# Patient Record
Sex: MALE | Race: WHITE | NOT HISPANIC OR LATINO | Employment: OTHER | URBAN - METROPOLITAN AREA
[De-identification: names, ages, dates, MRNs, and addresses within clinical notes are randomized per-mention and may not be internally consistent; named-entity substitution may affect disease eponyms.]

---

## 2023-08-14 ENCOUNTER — HOSPITAL ENCOUNTER (OUTPATIENT)
Dept: DATA CONVERSION | Facility: HOSPITAL | Age: 68
Discharge: HOME | End: 2023-08-14

## 2023-08-14 DIAGNOSIS — Z12.5 ENCOUNTER FOR SCREENING FOR MALIGNANT NEOPLASM OF PROSTATE: ICD-10-CM

## 2023-08-14 DIAGNOSIS — Z01.818 ENCOUNTER FOR OTHER PREPROCEDURAL EXAMINATION: ICD-10-CM

## 2023-08-14 DIAGNOSIS — I10 ESSENTIAL (PRIMARY) HYPERTENSION: ICD-10-CM

## 2023-08-14 LAB
ALBUMIN SERPL-MCNC: 4.4 GM/DL (ref 3.5–5)
ALBUMIN/GLOB SERPL: 1.7 RATIO (ref 1.5–3)
ALP BLD-CCNC: 87 U/L (ref 35–125)
ALT SERPL-CCNC: 24 U/L (ref 5–40)
ANION GAP SERPL CALCULATED.3IONS-SCNC: 10 MMOL/L (ref 0–19)
APPEARANCE PLAS: ABNORMAL
AST SERPL-CCNC: 19 U/L (ref 5–40)
BACTERIA UR QL AUTO: NEGATIVE
BASOPHILS # BLD AUTO: 0.03 K/UL (ref 0–0.22)
BASOPHILS NFR BLD AUTO: 0.5 % (ref 0–1)
BILIRUB SERPL-MCNC: 0.4 MG/DL (ref 0.1–1.2)
BILIRUB UR QL STRIP.AUTO: NEGATIVE
BUN SERPL-MCNC: 15 MG/DL (ref 8–25)
BUN/CREAT SERPL: 16.7 RATIO (ref 8–21)
CALCIUM SERPL-MCNC: 9.3 MG/DL (ref 8.5–10.4)
CHLORIDE SERPL-SCNC: 101 MMOL/L (ref 97–107)
CHOLEST SERPL-MCNC: 224 MG/DL (ref 133–200)
CHOLEST/HDLC SERPL: 4.5 RATIO
CLARITY UR: ABNORMAL
CO2 SERPL-SCNC: 25 MMOL/L (ref 24–31)
COLOR SPUN FLD: ABNORMAL
COLOR UR: ABNORMAL
CREAT SERPL-MCNC: 0.9 MG/DL (ref 0.4–1.6)
DEPRECATED RDW RBC AUTO: 42.5 FL (ref 37–54)
DIFFERENTIAL METHOD BLD: ABNORMAL
EOSINOPHIL # BLD AUTO: 0.1 K/UL (ref 0–0.45)
EOSINOPHIL NFR BLD: 1.7 % (ref 0–3)
ERYTHROCYTE [DISTWIDTH] IN BLOOD BY AUTOMATED COUNT: 12.7 % (ref 11.7–15)
FASTING STATUS PATIENT QL REPORTED: ABNORMAL
GFR SERPL CREATININE-BSD FRML MDRD: 92 ML/MIN/1.73 M2
GLOBULIN SER-MCNC: 2.6 G/DL (ref 1.9–3.7)
GLUCOSE SERPL-MCNC: 104 MG/DL (ref 65–99)
GLUCOSE UR STRIP.AUTO-MCNC: NEGATIVE MG/DL
HCT VFR BLD AUTO: 46.9 % (ref 41–50)
HDLC SERPL-MCNC: 50 MG/DL
HGB BLD-MCNC: 15.9 GM/DL (ref 13.5–16.5)
HGB UR QL STRIP.AUTO: ABNORMAL /HPF (ref 0–3)
HGB UR QL: NEGATIVE
HYALINE CASTS UR QL AUTO: ABNORMAL /LPF
IMM GRANULOCYTES # BLD AUTO: 0.02 K/UL (ref 0–0.1)
KETONES UR QL STRIP.AUTO: NEGATIVE
LDLC SERPL CALC-MCNC: 140 MG/DL (ref 65–130)
LEUKOCYTE ESTERASE UR QL STRIP.AUTO: NEGATIVE
LYMPHOCYTES # BLD AUTO: 1.35 K/UL (ref 1.2–3.2)
LYMPHOCYTES NFR BLD MANUAL: 23.4 % (ref 20–40)
MCH RBC QN AUTO: 31 PG (ref 26–34)
MCHC RBC AUTO-ENTMCNC: 33.9 % (ref 31–37)
MCV RBC AUTO: 91.4 FL (ref 80–100)
MICROALBUMIN UR-MCNC: 36 MG/L (ref 0–23)
MICROSCOPIC (UA): ABNORMAL
MONOCYTES # BLD AUTO: 0.56 K/UL (ref 0–0.8)
MONOCYTES NFR BLD MANUAL: 9.7 % (ref 0–8)
NEUTROPHILS # BLD AUTO: 3.72 K/UL
NEUTROPHILS # BLD AUTO: 3.72 K/UL (ref 1.8–7.7)
NEUTROPHILS.IMMATURE NFR BLD: 0.3 % (ref 0–1)
NEUTS SEG NFR BLD: 64.4 % (ref 50–70)
NITRITE UR QL STRIP.AUTO: NEGATIVE
NRBC BLD-RTO: 0 /100 WBC
PH UR STRIP.AUTO: 5.5 [PH] (ref 4.6–8)
PLATELET # BLD AUTO: 239 K/UL (ref 150–450)
PMV BLD AUTO: 11.7 CU (ref 7–12.6)
POTASSIUM SERPL-SCNC: 4.5 MMOL/L (ref 3.4–5.1)
PROT SERPL-MCNC: 7 G/DL (ref 5.9–7.9)
PROT UR STRIP.AUTO-MCNC: ABNORMAL MG/DL
PSA SERPL-MCNC: 2.4 NG/ML (ref 0–4.1)
RBC # BLD AUTO: 5.13 M/UL (ref 4.5–5.5)
SODIUM SERPL-SCNC: 136 MMOL/L (ref 133–145)
SP GR UR STRIP.AUTO: 1.02 (ref 1–1.03)
SQUAMOUS UR QL AUTO: ABNORMAL /HPF
TRIGL SERPL-MCNC: 171 MG/DL (ref 40–150)
TSH SERPL DL<=0.05 MIU/L-ACNC: 2.6 MIU/L (ref 0.27–4.2)
URATE SERPL-MCNC: 7.3 MG/DL (ref 3.6–7.7)
URINE CULTURE: ABNORMAL
UROBILINOGEN UR QL STRIP.AUTO: NORMAL MG/DL (ref 0–1)
WBC # BLD AUTO: 5.8 K/UL (ref 4.5–11)
WBC #/AREA URNS AUTO: ABNORMAL /HPF (ref 0–3)

## 2023-08-22 ENCOUNTER — HOSPITAL ENCOUNTER (OUTPATIENT)
Dept: DATA CONVERSION | Facility: HOSPITAL | Age: 68
End: 2023-08-22

## 2023-08-22 DIAGNOSIS — M19.011 PRIMARY OSTEOARTHRITIS, RIGHT SHOULDER: ICD-10-CM

## 2023-09-05 ENCOUNTER — HOSPITAL ENCOUNTER (OUTPATIENT)
Dept: DATA CONVERSION | Facility: HOSPITAL | Age: 68
End: 2023-09-05

## 2023-09-05 DIAGNOSIS — M19.011 PRIMARY OSTEOARTHRITIS, RIGHT SHOULDER: ICD-10-CM

## 2023-09-17 VITALS
BODY MASS INDEX: 23.91 KG/M2 | WEIGHT: 148.8 LBS | HEART RATE: 72 BPM | TEMPERATURE: 97.8 F | DIASTOLIC BLOOD PRESSURE: 66 MMHG | HEIGHT: 66 IN | SYSTOLIC BLOOD PRESSURE: 124 MMHG | OXYGEN SATURATION: 99 %

## 2023-09-20 ENCOUNTER — HOSPITAL ENCOUNTER (OUTPATIENT)
Dept: DATA CONVERSION | Facility: HOSPITAL | Age: 68
Discharge: HOME | End: 2023-09-20
Payer: MEDICARE

## 2023-09-20 DIAGNOSIS — M19.011 PRIMARY OSTEOARTHRITIS, RIGHT SHOULDER: ICD-10-CM

## 2023-09-20 LAB
BACTERIA UR QL AUTO: NEGATIVE
BILIRUB UR QL STRIP.AUTO: NEGATIVE
CLARITY UR: CLEAR
COLOR UR: NORMAL
GLUCOSE UR STRIP.AUTO-MCNC: NEGATIVE MG/DL
HGB UR QL STRIP.AUTO: 1 /HPF (ref 0–3)
HGB UR QL: NEGATIVE
HYALINE CASTS UR QL AUTO: NORMAL /LPF
KETONES UR QL STRIP.AUTO: NEGATIVE
LEUKOCYTE ESTERASE UR QL STRIP.AUTO: NEGATIVE
MICROSCOPIC (UA): NORMAL
MRSA DNA SPEC QL NAA+PROBE: NEGATIVE
NITRITE UR QL STRIP.AUTO: NEGATIVE
PH UR STRIP.AUTO: 5 [PH] (ref 4.6–8)
PROT UR STRIP.AUTO-MCNC: NEGATIVE MG/DL
SP GR UR STRIP.AUTO: 1.02 (ref 1–1.03)
SPECIMEN SOURCE: NORMAL
SQUAMOUS UR QL AUTO: NORMAL /HPF
URINE CULTURE: NORMAL
UROBILINOGEN UR QL STRIP.AUTO: NORMAL MG/DL (ref 0–1)
WBC #/AREA URNS AUTO: NORMAL /HPF (ref 0–3)

## 2023-09-21 PROBLEM — S68.119A: Status: ACTIVE | Noted: 2023-09-21

## 2023-09-21 PROBLEM — S68.119A TRAUMATIC AMPUTATION OF FINGER: Status: ACTIVE | Noted: 2023-09-21

## 2023-09-21 PROBLEM — M19.011 PRIMARY OSTEOARTHRITIS, RIGHT SHOULDER: Status: ACTIVE | Noted: 2023-09-21

## 2023-09-21 PROBLEM — E87.5 HYPERKALEMIA: Status: ACTIVE | Noted: 2023-09-21

## 2023-09-21 PROBLEM — I10 HYPERTENSION: Status: ACTIVE | Noted: 2023-09-21

## 2023-09-21 RX ORDER — LISINOPRIL 10 MG/1
TABLET ORAL
COMMUNITY
End: 2024-02-14 | Stop reason: SDUPTHER

## 2023-09-21 RX ORDER — OMEPRAZOLE 20 MG/1
TABLET, DELAYED RELEASE ORAL
COMMUNITY

## 2023-09-21 RX ORDER — TIZANIDINE 4 MG/1
TABLET ORAL
COMMUNITY
Start: 2023-01-04

## 2023-09-21 RX ORDER — EPINEPHRINE 0.3 MG/.3ML
INJECTION INTRAMUSCULAR
COMMUNITY
Start: 2023-01-04

## 2023-10-02 RX ORDER — CEFAZOLIN SODIUM 2 G/100ML
2 INJECTION, SOLUTION INTRAVENOUS ONCE
Status: CANCELLED | OUTPATIENT
Start: 2023-10-02 | End: 2023-10-02

## 2023-10-03 ENCOUNTER — PREP FOR PROCEDURE (OUTPATIENT)
Dept: ORTHOPEDIC SURGERY | Facility: HOSPITAL | Age: 68
End: 2023-10-03
Payer: MEDICARE

## 2023-10-03 ENCOUNTER — ANESTHESIA EVENT (OUTPATIENT)
Dept: OPERATING ROOM | Facility: HOSPITAL | Age: 68
End: 2023-10-03
Payer: MEDICARE

## 2023-10-04 ENCOUNTER — APPOINTMENT (OUTPATIENT)
Dept: RADIOLOGY | Facility: HOSPITAL | Age: 68
End: 2023-10-04
Payer: MEDICARE

## 2023-10-04 ENCOUNTER — HOSPITAL ENCOUNTER (OUTPATIENT)
Facility: HOSPITAL | Age: 68
Setting detail: SURGERY ADMIT
Discharge: HOME | End: 2023-10-04
Attending: ORTHOPAEDIC SURGERY | Admitting: ORTHOPAEDIC SURGERY
Payer: MEDICARE

## 2023-10-04 ENCOUNTER — ANESTHESIA (OUTPATIENT)
Dept: OPERATING ROOM | Facility: HOSPITAL | Age: 68
End: 2023-10-04
Payer: MEDICARE

## 2023-10-04 VITALS
HEIGHT: 66 IN | SYSTOLIC BLOOD PRESSURE: 131 MMHG | RESPIRATION RATE: 20 BRPM | OXYGEN SATURATION: 97 % | HEART RATE: 74 BPM | TEMPERATURE: 96.4 F | WEIGHT: 148.59 LBS | DIASTOLIC BLOOD PRESSURE: 87 MMHG | BODY MASS INDEX: 23.88 KG/M2

## 2023-10-04 DIAGNOSIS — M19.011 PRIMARY OSTEOARTHRITIS, RIGHT SHOULDER: Primary | ICD-10-CM

## 2023-10-04 PROBLEM — Z92.89 HISTORY OF PSYCHIATRIC TREATMENT: Status: ACTIVE | Noted: 2023-10-04

## 2023-10-04 PROBLEM — K21.9 GASTROESOPHAGEAL REFLUX DISEASE: Status: ACTIVE | Noted: 2023-10-04

## 2023-10-04 PROCEDURE — 7100000009 HC PHASE TWO TIME - INITIAL BASE CHARGE: Performed by: ORTHOPAEDIC SURGERY

## 2023-10-04 PROCEDURE — 2500000005 HC RX 250 GENERAL PHARMACY W/O HCPCS: Performed by: NURSE ANESTHETIST, CERTIFIED REGISTERED

## 2023-10-04 PROCEDURE — 7100000010 HC PHASE TWO TIME - EACH INCREMENTAL 1 MINUTE: Performed by: ORTHOPAEDIC SURGERY

## 2023-10-04 PROCEDURE — 7100000001 HC RECOVERY ROOM TIME - INITIAL BASE CHARGE: Performed by: ORTHOPAEDIC SURGERY

## 2023-10-04 PROCEDURE — 2500000005 HC RX 250 GENERAL PHARMACY W/O HCPCS: Performed by: ORTHOPAEDIC SURGERY

## 2023-10-04 PROCEDURE — 3600000005 HC OR TIME - INITIAL BASE CHARGE - PROCEDURE LEVEL FIVE: Performed by: ORTHOPAEDIC SURGERY

## 2023-10-04 PROCEDURE — A23472 PR RECONSTR TOTAL SHOULDER IMPLANT: Performed by: ANESTHESIOLOGY

## 2023-10-04 PROCEDURE — 2720000007 HC OR 272 NO HCPCS: Performed by: ORTHOPAEDIC SURGERY

## 2023-10-04 PROCEDURE — 23430 REPAIR BICEPS TENDON: CPT | Performed by: PHYSICIAN ASSISTANT

## 2023-10-04 PROCEDURE — 3700000001 HC GENERAL ANESTHESIA TIME - INITIAL BASE CHARGE: Performed by: ORTHOPAEDIC SURGERY

## 2023-10-04 PROCEDURE — 3600000010 HC OR TIME - EACH INCREMENTAL 1 MINUTE - PROCEDURE LEVEL FIVE: Performed by: ORTHOPAEDIC SURGERY

## 2023-10-04 PROCEDURE — 2500000004 HC RX 250 GENERAL PHARMACY W/ HCPCS (ALT 636 FOR OP/ED): Performed by: PHYSICIAN ASSISTANT

## 2023-10-04 PROCEDURE — 3700000002 HC GENERAL ANESTHESIA TIME - EACH INCREMENTAL 1 MINUTE: Performed by: ORTHOPAEDIC SURGERY

## 2023-10-04 PROCEDURE — 7100000002 HC RECOVERY ROOM TIME - EACH INCREMENTAL 1 MINUTE: Performed by: ORTHOPAEDIC SURGERY

## 2023-10-04 PROCEDURE — 23430 REPAIR BICEPS TENDON: CPT | Performed by: ORTHOPAEDIC SURGERY

## 2023-10-04 PROCEDURE — A23472 PR RECONSTR TOTAL SHOULDER IMPLANT: Performed by: NURSE ANESTHETIST, CERTIFIED REGISTERED

## 2023-10-04 PROCEDURE — 23472 RECONSTRUCT SHOULDER JOINT: CPT | Performed by: PHYSICIAN ASSISTANT

## 2023-10-04 PROCEDURE — 23472 RECONSTRUCT SHOULDER JOINT: CPT | Performed by: ORTHOPAEDIC SURGERY

## 2023-10-04 PROCEDURE — 76942 ECHO GUIDE FOR BIOPSY: CPT | Performed by: ANESTHESIOLOGY

## 2023-10-04 PROCEDURE — 2500000004 HC RX 250 GENERAL PHARMACY W/ HCPCS (ALT 636 FOR OP/ED): Performed by: ANESTHESIOLOGY

## 2023-10-04 PROCEDURE — 2780000003 HC OR 278 NO HCPCS: Performed by: ORTHOPAEDIC SURGERY

## 2023-10-04 PROCEDURE — C1776 JOINT DEVICE (IMPLANTABLE): HCPCS | Performed by: ORTHOPAEDIC SURGERY

## 2023-10-04 PROCEDURE — 73020 X-RAY EXAM OF SHOULDER: CPT | Mod: RT

## 2023-10-04 PROCEDURE — 2500000004 HC RX 250 GENERAL PHARMACY W/ HCPCS (ALT 636 FOR OP/ED): Performed by: NURSE ANESTHETIST, CERTIFIED REGISTERED

## 2023-10-04 PROCEDURE — 2580000001 HC RX 258 IV SOLUTIONS: Performed by: NURSE ANESTHETIST, CERTIFIED REGISTERED

## 2023-10-04 DEVICE — UNIVERS REVERS HUMERAL STEM, SIZE 9
Type: IMPLANTABLE DEVICE | Site: SHOULDER | Status: FUNCTIONAL
Brand: ARTHREX®

## 2023-10-04 DEVICE — DYNANITE VIP GLENOID PIN, NITINOL, 2.8MM
Type: IMPLANTABLE DEVICE | Site: SHOULDER | Status: FUNCTIONAL
Brand: ARTHREX®

## 2023-10-04 DEVICE — HUM INSERT M/39+6 TO FIT IN 39 CUP CONST
Type: IMPLANTABLE DEVICE | Site: SHOULDER | Status: FUNCTIONAL
Brand: ARTHREX®

## 2023-10-04 RX ORDER — MIDAZOLAM HYDROCHLORIDE 1 MG/ML
2 INJECTION, SOLUTION INTRAMUSCULAR; INTRAVENOUS ONCE
Status: COMPLETED | OUTPATIENT
Start: 2023-10-04 | End: 2023-10-04

## 2023-10-04 RX ORDER — SODIUM CHLORIDE, SODIUM LACTATE, POTASSIUM CHLORIDE, CALCIUM CHLORIDE 600; 310; 30; 20 MG/100ML; MG/100ML; MG/100ML; MG/100ML
100 INJECTION, SOLUTION INTRAVENOUS CONTINUOUS
Status: DISCONTINUED | OUTPATIENT
Start: 2023-10-04 | End: 2023-10-04

## 2023-10-04 RX ORDER — SODIUM CHLORIDE, SODIUM LACTATE, POTASSIUM CHLORIDE, CALCIUM CHLORIDE 600; 310; 30; 20 MG/100ML; MG/100ML; MG/100ML; MG/100ML
INJECTION, SOLUTION INTRAVENOUS CONTINUOUS PRN
Status: DISCONTINUED | OUTPATIENT
Start: 2023-10-04 | End: 2023-10-04

## 2023-10-04 RX ORDER — ALBUTEROL SULFATE 0.83 MG/ML
2.5 SOLUTION RESPIRATORY (INHALATION) ONCE AS NEEDED
Status: DISCONTINUED | OUTPATIENT
Start: 2023-10-04 | End: 2023-10-04

## 2023-10-04 RX ORDER — OXYCODONE AND ACETAMINOPHEN 5; 325 MG/1; MG/1
1 TABLET ORAL EVERY 6 HOURS PRN
Qty: 28 TABLET | Refills: 0 | Status: SHIPPED | OUTPATIENT
Start: 2023-10-04 | End: 2023-10-11

## 2023-10-04 RX ORDER — LABETALOL HYDROCHLORIDE 5 MG/ML
5 INJECTION, SOLUTION INTRAVENOUS ONCE AS NEEDED
Status: DISCONTINUED | OUTPATIENT
Start: 2023-10-04 | End: 2023-10-04

## 2023-10-04 RX ORDER — ONDANSETRON HYDROCHLORIDE 2 MG/ML
4 INJECTION, SOLUTION INTRAVENOUS ONCE AS NEEDED
Status: DISCONTINUED | OUTPATIENT
Start: 2023-10-04 | End: 2023-10-04

## 2023-10-04 RX ORDER — ALBUTEROL SULFATE 0.83 MG/ML
2.5 SOLUTION RESPIRATORY (INHALATION) ONCE
Status: DISCONTINUED | OUTPATIENT
Start: 2023-10-04 | End: 2023-10-04

## 2023-10-04 RX ORDER — CEFAZOLIN SODIUM 2 G/100ML
2 INJECTION, SOLUTION INTRAVENOUS ONCE
Status: COMPLETED | OUTPATIENT
Start: 2023-10-04 | End: 2023-10-04

## 2023-10-04 RX ORDER — FENTANYL CITRATE 50 UG/ML
50 INJECTION, SOLUTION INTRAMUSCULAR; INTRAVENOUS ONCE
Status: COMPLETED | OUTPATIENT
Start: 2023-10-04 | End: 2023-10-04

## 2023-10-04 RX ORDER — DOXYCYCLINE 100 MG/1
100 CAPSULE ORAL 2 TIMES DAILY
Qty: 14 CAPSULE | Refills: 0 | Status: SHIPPED | OUTPATIENT
Start: 2023-10-04 | End: 2023-10-11

## 2023-10-04 RX ORDER — ONDANSETRON HYDROCHLORIDE 2 MG/ML
INJECTION, SOLUTION INTRAVENOUS AS NEEDED
Status: DISCONTINUED | OUTPATIENT
Start: 2023-10-04 | End: 2023-10-04

## 2023-10-04 RX ORDER — TRANEXAMIC ACID 100 MG/ML
INJECTION, SOLUTION INTRAVENOUS AS NEEDED
Status: DISCONTINUED | OUTPATIENT
Start: 2023-10-04 | End: 2023-10-04 | Stop reason: HOSPADM

## 2023-10-04 RX ORDER — LIDOCAINE HYDROCHLORIDE 10 MG/ML
0.1 INJECTION, SOLUTION EPIDURAL; INFILTRATION; INTRACAUDAL; PERINEURAL ONCE
Status: DISCONTINUED | OUTPATIENT
Start: 2023-10-04 | End: 2023-10-04

## 2023-10-04 RX ORDER — PHENYLEPHRINE HCL IN 0.9% NACL 0.4MG/10ML
SYRINGE (ML) INTRAVENOUS AS NEEDED
Status: DISCONTINUED | OUTPATIENT
Start: 2023-10-04 | End: 2023-10-04

## 2023-10-04 RX ORDER — ROCURONIUM BROMIDE 10 MG/ML
INJECTION, SOLUTION INTRAVENOUS AS NEEDED
Status: DISCONTINUED | OUTPATIENT
Start: 2023-10-04 | End: 2023-10-04

## 2023-10-04 RX ORDER — PROPOFOL 10 MG/ML
INJECTION, EMULSION INTRAVENOUS AS NEEDED
Status: DISCONTINUED | OUTPATIENT
Start: 2023-10-04 | End: 2023-10-04

## 2023-10-04 RX ORDER — IPRATROPIUM BROMIDE 0.5 MG/2.5ML
500 SOLUTION RESPIRATORY (INHALATION) ONCE
Status: DISCONTINUED | OUTPATIENT
Start: 2023-10-04 | End: 2023-10-04

## 2023-10-04 RX ADMIN — MIDAZOLAM 2 MG: 1 INJECTION INTRAMUSCULAR; INTRAVENOUS at 11:15

## 2023-10-04 RX ADMIN — Medication 200 MCG: at 12:39

## 2023-10-04 RX ADMIN — SODIUM CHLORIDE, POTASSIUM CHLORIDE, SODIUM LACTATE AND CALCIUM CHLORIDE: 600; 310; 30; 20 INJECTION, SOLUTION INTRAVENOUS at 13:11

## 2023-10-04 RX ADMIN — ONDANSETRON 4 MG: 2 INJECTION INTRAMUSCULAR; INTRAVENOUS at 13:00

## 2023-10-04 RX ADMIN — ROCURONIUM BROMIDE 50 MG: 10 INJECTION, SOLUTION INTRAVENOUS at 12:32

## 2023-10-04 RX ADMIN — DEXAMETHASONE SODIUM PHOSPHATE 8 MG: 4 INJECTION, SOLUTION INTRAMUSCULAR; INTRAVENOUS at 13:00

## 2023-10-04 RX ADMIN — SODIUM CHLORIDE, POTASSIUM CHLORIDE, SODIUM LACTATE AND CALCIUM CHLORIDE: 600; 310; 30; 20 INJECTION, SOLUTION INTRAVENOUS at 12:27

## 2023-10-04 RX ADMIN — CEFAZOLIN SODIUM 2 G: 2 INJECTION, SOLUTION INTRAVENOUS at 12:35

## 2023-10-04 RX ADMIN — SUGAMMADEX 200 MG: 100 INJECTION, SOLUTION INTRAVENOUS at 14:05

## 2023-10-04 RX ADMIN — PROPOFOL 200 MG: 10 INJECTION, EMULSION INTRAVENOUS at 12:32

## 2023-10-04 RX ADMIN — FENTANYL CITRATE 50 MCG: 50 INJECTION INTRAMUSCULAR; INTRAVENOUS at 11:34

## 2023-10-04 SDOH — HEALTH STABILITY: MENTAL HEALTH: CURRENT SMOKER: 0

## 2023-10-04 ASSESSMENT — COLUMBIA-SUICIDE SEVERITY RATING SCALE - C-SSRS
6. HAVE YOU EVER DONE ANYTHING, STARTED TO DO ANYTHING, OR PREPARED TO DO ANYTHING TO END YOUR LIFE?: NO
1. IN THE PAST MONTH, HAVE YOU WISHED YOU WERE DEAD OR WISHED YOU COULD GO TO SLEEP AND NOT WAKE UP?: NO
2. HAVE YOU ACTUALLY HAD ANY THOUGHTS OF KILLING YOURSELF?: NO

## 2023-10-04 ASSESSMENT — PAIN SCALES - GENERAL
PAINLEVEL_OUTOF10: 0 - NO PAIN
PAIN_LEVEL: 2
PAINLEVEL_OUTOF10: 0 - NO PAIN
PAINLEVEL_OUTOF10: 0 - NO PAIN
PAINLEVEL_OUTOF10: 3
PAINLEVEL_OUTOF10: 0 - NO PAIN
PAINLEVEL_OUTOF10: 3
PAINLEVEL_OUTOF10: 0 - NO PAIN
PAINLEVEL_OUTOF10: 0 - NO PAIN
PAINLEVEL_OUTOF10: 2

## 2023-10-04 ASSESSMENT — PAIN - FUNCTIONAL ASSESSMENT
PAIN_FUNCTIONAL_ASSESSMENT: 0-10

## 2023-10-04 ASSESSMENT — PAIN DESCRIPTION - DESCRIPTORS: DESCRIPTORS: ACHING

## 2023-10-04 NOTE — ANESTHESIA PREPROCEDURE EVALUATION
Patient: Lew Guidry    Procedure Information       Date/Time: 10/04/23 1130    Procedure: RIGHT PRIMARY VERSUS RIGHT REVERSE SHOULDER REPLACEMENT (ARTHREX) (Right: Shoulder)    Location: [facility] / [facility]    Surgeons: Brice Kent MD            Relevant Problems   Anesthesia (within normal limits)      Cardiovascular   (+) Hypertension      Endocrine (within normal limits)      GI   (+) Gastroesophageal reflux disease      /Renal (within normal limits)      Pulmonary (within normal limits)      GI/Hepatic (within normal limits)      Hematology (within normal limits)      Musculoskeletal   (+) Primary osteoarthritis, right shoulder      Eyes, Ears, Nose, and Throat (within normal limits)      Infectious Disease (within normal limits)       Clinical information reviewed:   Tobacco  Allergies  Meds   Med Hx  Surg Hx   Fam Hx  Soc Hx        NPO Detail:  NPO/Void Status  Date of Last Liquid: 10/03/23  Time of Last Liquid: 1400  Date of Last Solid: 10/03/23  Time of Last Solid: 1400  Time of Last Void: 0800         Physical Exam    Airway  Mallampati: II  TM distance: >3 FB  Neck ROM: full     Cardiovascular    Dental    Pulmonary    Abdominal        Anesthesia Plan    ASA 2     general and regional   (R ISB)  The patient is not a current smoker.  Patient was not previously instructed to abstain from smoking on day of procedure.  Patient did not smoke on day of procedure.  Education provided regarding risk of obstructive sleep apnea.  intravenous induction   Postoperative administration of opioids is intended.  Trial extubation is planned.  Anesthetic plan and risks discussed with patient and spouse.  Use of blood products discussed with patient and spouse who consented to blood products.    Plan discussed with CRNA.

## 2023-10-04 NOTE — DISCHARGE INSTRUCTIONS
You had a shoulder replacement done today. You had a nerve block prior to surgery that will make the arm numb for 18-24 hours after surgery. You may feel tingling in the hand and arm as the block wears off which is very normal.    Bruising and some swelling into the shoulder, elbow, and chest region is very normal, gravuty will slowly pull this down as well even into the abdomen and lower arm. Any severe swelling or redness please call the office.     Pain medication will be sent to your pharmacy, if you are ABLE, you can take 800mg Ibuprofen as well or alternate the pain medication and ibuprofen every 4-6 hours.    Once nerve block has worn off, you may remove abduction pillow from sling, you can use the sling with activity and at night but while sitting and laying down ALLOW ELBOW TO STRAIGHTEN OUT OF SLING as much as possible.    Ok to move wrist and fingers fully immediately after surgery, this will help pump fluid and swelling out of the hand and forearm.    Please call the office at [phone] for a follow up in 10-14 days post-op

## 2023-10-04 NOTE — ANESTHESIA POSTPROCEDURE EVALUATION
Patient: Lew Guidry    Procedure Summary       Date: 10/04/23 Room / Location: [facility] / [facility]    Anesthesia Start: 1227 Anesthesia Stop: 1419    Procedure: RIGHT PRIMARY VERSUS RIGHT REVERSE SHOULDER REPLACEMENT (ARTHREX) (Right: Shoulder) Diagnosis:     Surgeons: Brice Kent MD Responsible Provider: Wil Reyes DO    Anesthesia Type: general, regional ASA Status: 2            Anesthesia Type: general, regional    Vitals Value Taken Time   /78 10/04/23 1430   Temp 36.6 °C (97.9 °F) 10/04/23 1414   Pulse 68 10/04/23 1430   Resp 21 10/04/23 1430   SpO2 96 % 10/04/23 1430       Anesthesia Post Evaluation    Patient location during evaluation: bedside  Level of consciousness: awake  Pain score: 2  Pain management: adequate  Airway patency: patent  Two or more strategies used to mitigate risk of obstructive sleep apnea  Cardiovascular status: acceptable  Respiratory status: acceptable  Hydration status: acceptable        There were no known notable events for this encounter.

## 2023-10-04 NOTE — ANESTHESIA PROCEDURE NOTES
Airway  Date/Time: 10/4/2023 12:39 PM  Urgency: elective    Airway not difficult    Staffing  Performed: CRNA   Authorized by: Wil Reyes DO    Performed by: YOBANY Ferrara  Patient location during procedure: OR    Indications and Patient Condition  Indications for airway management: anesthesia  Spontaneous Ventilation: absent  Sedation level: deep  Preoxygenated: yes  Patient position: sniffing  Mask difficulty assessment: 1 - vent by mask    Final Airway Details  Final airway type: endotracheal airway      Successful airway: ETT  Cuffed: yes   Successful intubation technique: direct laryngoscopy  Facilitating devices/methods: intubating stylet  Endotracheal tube insertion site: oral  Blade: Burak  Blade size: #4  ETT size (mm): 7.5  Cormack-Lehane Classification: grade I - full view of glottis  Placement verified by: chest auscultation, capnometry and palpation of cuff   Inital cuff pressure (cm H2O): 7  Cuff volume (mL): 7  Measured from: lips  ETT to lips (cm): 23  Number of attempts at approach: 1

## 2023-10-04 NOTE — ANESTHESIA PROCEDURE NOTES
Peripheral Block    Patient location during procedure: pre-op  Start time: 10/4/2023 11:39 AM  End time: 10/4/2023 11:41 AM  Reason for block: at surgeon's request and post-op pain management  Staffing  Performed: attending   Authorized by: Temitope Lamas MD MPH    Performed by: Temitope Lamas MD MPH  Preanesthetic Checklist  Completed: patient identified, IV checked, site marked, risks and benefits discussed, surgical consent, monitors and equipment checked, pre-op evaluation and timeout performed   Timeout performed at: 10/4/2023 11:33 AM  Peripheral Block  Patient position: sitting  Prep: ChloraPrep  Patient monitoring: heart rate  Block type: interscalene  Laterality: right  Injection technique: single-shot  Guidance: ultrasound guided  Local infiltration: ropivacaine  Infiltration strength: 0.5 %  Dose: 25 mL  Needle  Needle gauge: 21 G  Needle length: 10 cm  Needle localization: ultrasound guidance  Assessment  Injection assessment: negative aspiration for heme, no paresthesia on injection, incremental injection and local visualized surrounding nerve on ultrasound  Slow fractionated injection: yes  Additional Notes  Pt tolerated procedure well

## 2023-10-04 NOTE — OP NOTE
RIGHT PRIMARY VERSUS RIGHT REVERSE SHOULDER REPLACEMENT (ARTHREX) (R) Operative Note     Date: 10/4/2023  OR Location: [facility]    Name: Lew Guidry : 1955, Age: 67 y.o., MRN: [id number], Sex: male    Diagnosis  Right severe shoulder arthritis    Procedures    * RIGHT PRIMARY VERSUS RIGHT REVERSE SHOULDER REPLACEMENT (ARTHREX)  #2 right shoulder open biceps tenodesis    Surgeons      * Brice Kent - Primary    Resident/Fellow/Other Assistant:  Petrona Cook PA-C   procedure Summary  Anesthesia: General  ASA: II  Anesthesia Staff: Anesthesiologist: Wil Reyes DO  CRNA: Emily Patel APRN-CRNA  Estimated Blood Loss: 75mL  Intra-op Medications:   Medication Name Total Dose   ceFAZolin in dextrose (iso-os) (Ancef) IVPB 2 g 2 g   fentaNYL PF (Sublimaze) injection 50 mcg 50 mcg              Anesthesia Record               Intraprocedure I/O Totals       None           Specimen: No specimens collected     Staff:   Circulator: Sophia Fox RN  Scrub Person: Temitope Goodson PA-C; Petrona Trujillo         Drains and/or Catheters: * None in log *    Tourniquet Times:         Implants:  Implants       Type Name Action Serial No.       2.8MM DYNANITE VIP GLENOID PIN, NITINOL Implanted       2.8MM DYNANITE VIP GLENOID PIN, NITINOL Implanted       24MM ARTHREX BASEPLATE, 10-DEGREE FULL AUG, +2 LAT, ST Implanted       5.5X20MM PERIPHERAL SCREW, LOCKING Implanted       SIZE 39, 4, UNIVERS REVERS LATERALIZED GLENOSPHERE, 24MM BASEPLATE TAPER Implanted       5.5X20MM PERIPHERAL SCREW, LOCKING Implanted       5.5X20MM PERIPHERAL SCREW, LOCKING Implanted      Screw INSERT, HUMERAL, M/39 +6, CONST - [id number] Implanted      Joint STEM, HUMERAL, UNIVERS REVERS, SZ 9MM - [id number] Implanted               Findings: As below    Indications: Lew Guidry is an 67 y.o. male who is having surgery for * No pre-op diagnosis entered *.  Severe shoulder arthritis, patient understand clearly that there are severe risk with this  procedure and he has very poor function and severe contracture in his age especially with the erosion seen on CT scan reverse replacement gives him the best option for stability and good range of motion and strength.  Wish to proceed understands the risk involved such as nerve artery tendon damage infection continued pain in the amount of recovery and function and pain relief can be quite variable.  Understands his risk completely and wished to proceed    The patient was seen in the preoperative area. The risks, benefits, complications, treatment options, non-operative alternatives, expected recovery and outcomes were discussed with the patient. The possibilities of reaction to medication, pulmonary aspiration, injury to surrounding structures, bleeding, recurrent infection, the need for additional procedures, failure to diagnose a condition, and creating a complication requiring transfusion or operation were discussed with the patient. The patient concurred with the proposed plan, giving informed consent.  The site of surgery was properly noted/marked if necessary per policy. The patient has been actively warmed in preoperative area. Preoperative antibiotics have been ordered and given within 1 hours of incision. Venous thrombosis prophylaxis have been ordered including bilateral sequential compression devices    Procedure Details: Pleasant patient brought the operating room and after sterilely prepping draping form a timeout we did a standard deltopectoral approach we did a tedious capsule release due to the severe large osteophytes and severe arthritic change we did a nice capsular release dislocated head and trimmed severe osteophytes cut the head in standard version exposed the glenoid there are also osteophytes of the glenoid and multiple loose bodies there was a torn biceps that was inflamed that we performed a biceps tenodesis to the partial release pectoralis major tendon at this point we placed our glenoid  with a 10 degree posterior augment due to the version issues we had excellent bone quality good fixation we used +4 on the glenoid and +4 on the sphere.  Screw was placed and locked in good stability at this point we went up to a size 8 on the humeral side with good bone quality trimmed osteophytes placed our final implant with a +6 conforming implant.  We had excellent stability good range of motion no overtensioning.  At this point we copiously irrigated first and then did topical TXA had good hemostasis did chlorhexidine wash layered closure was performed.  Patient placed in abduction pillow sling no complications.  Petrona Cook PA-C active surgical assistant during this case and assistance greatly reduced operative time and aided in performance the case  Complications:  None; patient tolerated the procedure well.    Disposition: PACU - hemodynamically stable.  Condition: stable         Additional Details: None    Attending Attestation: I performed the procedure.    Brice Kent  Phone Number: [phone]

## 2023-10-05 DIAGNOSIS — M19.011 OSTEOARTHRITIS OF RIGHT SHOULDER, UNSPECIFIED OSTEOARTHRITIS TYPE: Primary | ICD-10-CM

## 2023-10-06 ENCOUNTER — EVALUATION (OUTPATIENT)
Dept: OCCUPATIONAL THERAPY | Facility: CLINIC | Age: 68
End: 2023-10-06
Payer: MEDICARE

## 2023-10-06 DIAGNOSIS — M19.011 PRIMARY OSTEOARTHRITIS, RIGHT SHOULDER: Primary | ICD-10-CM

## 2023-10-06 PROCEDURE — 97110 THERAPEUTIC EXERCISES: CPT | Mod: GO | Performed by: OCCUPATIONAL THERAPIST

## 2023-10-06 PROCEDURE — 97165 OT EVAL LOW COMPLEX 30 MIN: CPT | Mod: GO | Performed by: OCCUPATIONAL THERAPIST

## 2023-10-06 ASSESSMENT — ENCOUNTER SYMPTOMS
OCCASIONAL FEELINGS OF UNSTEADINESS: 0
LOSS OF SENSATION IN FEET: 0
DEPRESSION: 0

## 2023-10-06 ASSESSMENT — PAIN SCALES - GENERAL: PAINLEVEL_OUTOF10: 6

## 2023-10-06 ASSESSMENT — ACTIVITIES OF DAILY LIVING (ADL): BATHING_ASSISTANCE: MODERATE

## 2023-10-06 NOTE — Clinical Note
October 6, 2023     Patient: Lew Guidry   YOB: 1955   Date of Visit: 10/6/2023       To Whom it May Concern:    Lew Guidry was seen in my clinic on 10/6/2023. He {Return to school/sport:20449}.    If you have any questions or concerns, please don't hesitate to call.         Sincerely,          Nini Leigh OT        CC: No Recipients

## 2023-10-06 NOTE — PROGRESS NOTES
Occupational Therapy    Evaluation/Treatment    Patient Name: Lew Guidry  MRN: [id number]  : 1955  Today's Date: 10/06/23          Assessment:Patient reports progressive worsening of R shoulder pain, stiffness, and weakness x 25 years with more acute symptoms within last 2 years. Patient reports having an x-ray and multiple cortisone injections. Patient underwent a R RTSR on 10/4/23.        Plan:Recommend OT to tx 1-2x/wk x 16 weeks or 32 visits.           Subjective   Current Problem:  1. Primary osteoarthritis, right shoulder  Follow Up In Occupational Therapy        General:   OT Received On: 10/06/23  General  Reason for Referral: R shoulder pain and stiffness.  Referred By: Madelyn  Family/Caregiver Present: No  Precautions:  Post-Surgical Precautions: Right shoulder precautions  Splinting: sling present  Precautions Comment: 2 weeks=elbow AROM, 12 weeks=strengthening       Pain:  Pain Assessment  Pain Score: 6  Pain Type: Surgical pain  Pain Location: Shoulder    Objective   Cognition:  Overall Cognitive Status: Within Functional Limits           Home Living:  Lives With: Spouse  Prior Function:     IADL History:  Occupation: Retired  Type of Occupation: police opfficer  ADL:  Bathing Assistance: Moderate  UE Dressing Assistance: Moderate  UE Dressing Deficit: Pull over head  LE Dressing Assistance: Moderate                               Therapy/Activity:    Sensory:     Cognitive Skill Development:     Community/Work Reintegration Training:     Community Mobility:     Sensation:  Sensation Comment: intact throughtout R UE  Strength:     Other Activity:     Home Environment:     Perception:     Coordination:      Hand Function:     Extremities: RUE AROM (degrees)  R Wrist Flexion 0-80:  (stiffness is noted)  R Wrist Extension 0-70:  (stiffness is noted)  R Wrist Radial Deviation 0-20:  (stiffness is noted)  R Wrist Ulnar Deviation 0-30:  (stiffness is noted)  RUE PROM (degrees)  R Shoulder Flexion   0-170: 55 Degrees  R Shoulder Abduction 0-160/180: 25 Degrees  R Elbow Flexion/Extension 0-135-150: 0 °        Education Documentation  No documentation found.  Education Comments  No comments found.        OP EDUCATION:  Education  Individual(s) Educated: Patient  Education Provided: Anatomy & Physiology, Diagnosis & Precautions, POC discussed and agreed upon  Home Program: PROM, AROM  Diagnosis and Precautions: 2 weeks=elbow GAGE; 12 weeks=strengthening of shoulder  Risk and Benefits Discussed with Patient/Caregiver/Other: yes  Patient/Caregiver Demonstrated Understanding: yes  Patient Response to Education: Patient/Caregiver Verbalized Understanding of Information  Education Comment: Patient was instructed on AROM of hand and wrist and AAROM of elbow and FA; scapular mobs and pendulums x 10 reps each    Goals:  Encounter Problems       Encounter Problems (Active)       OT Goals       R shoulder AROM is sufficient for independent completion of all ADL's and home management.        Start:  10/06/23    Expected End:  02/03/24            R shoulder strength is sufficient for independent completion of all ADL's and home management.        Start:  10/06/23    Expected End:  02/03/24            Patient c/0 2/10 pain or less of R shoulder during all ADL's and home exercises.        Start:  10/06/23    Expected End:  02/03/24               OT Problem       PATIENT WILL ACHIEVE right SHOULDER FLEXION  DEGREES       Start:  10/06/23    Expected End:  11/17/23            PATIENT WILL ACHIEVE right ELBOW ROM 0-140 DEGREES       Start:  10/06/23    Expected End:  11/17/23

## 2023-10-06 NOTE — Clinical Note
October 6, 2023     Patient: Lew Guidry   YOB: 1955   Date of Visit: 10/6/2023       To Whom It May Concern:    It is my medical opinion that Lew Guidry {Work release (duty restriction):29038}.    If you have any questions or concerns, please don't hesitate to call.         Sincerely,        Nini Leigh OT    CC: No Recipients

## 2023-10-13 ENCOUNTER — TREATMENT (OUTPATIENT)
Dept: OCCUPATIONAL THERAPY | Facility: CLINIC | Age: 68
End: 2023-10-13
Payer: MEDICARE

## 2023-10-13 DIAGNOSIS — M19.011 PRIMARY OSTEOARTHRITIS, RIGHT SHOULDER: Primary | ICD-10-CM

## 2023-10-13 PROCEDURE — 97110 THERAPEUTIC EXERCISES: CPT | Mod: GO | Performed by: OCCUPATIONAL THERAPIST

## 2023-10-13 ASSESSMENT — PAIN - FUNCTIONAL ASSESSMENT: PAIN_FUNCTIONAL_ASSESSMENT: 0-10

## 2023-10-13 ASSESSMENT — PAIN SCALES - GENERAL: PAINLEVEL_OUTOF10: 2

## 2023-10-13 ASSESSMENT — ENCOUNTER SYMPTOMS
DEPRESSION: 0
OCCASIONAL FEELINGS OF UNSTEADINESS: 0
LOSS OF SENSATION IN FEET: 0

## 2023-10-13 NOTE — PROGRESS NOTES
Occupational Therapy      Occupational Therapy Treatment    Name: Lew Guidry  MRN: [id number]  : 1955  Date: 10/13/23       Assessment:  OT Assessment Results: Decreased ADL status, Decreased upper extremity range of motion, Decreased upper extremity strength  Strengths: Ability to acquire knowledge, Attitude of self  Plan:       Subjective   General:  OT Last Visit  OT Received On: 10/13/23 (Visit #1)  General  Reason for Referral: R shoulder pain  Referred By: Madelyn  Pain Assessment:  Pain Assessment  Pain Assessment: 0-10  Pain Score: 2  Pain Type: Surgical pain  Pain Location: Shoulder    Objective        Modalities:  Hot Pack x 5 mins         Therapy/Activity: Therapeutic Exercise  Therapeutic Exercise Performed: Yes  Therapeutic Exercise Activity 1: digit 1-5 E/F x 10 reps  Therapeutic Exercise Activity 2: opposition x 10 reps  Therapeutic Exercise Activity 3: wrist E/F and UD/RD x 10 reps  Therapeutic Exercise Activity 4: FA sup/pron and elbow E/F AAROM x 10 reps each  Therapeutic Exercise Activity 5: scapular elevation, retraction, CW rolls, and CCW rolls x 10 reps each  Therapeutic Exercise Activity 6: pendulums x 10 reps each  Therapeutic Exercise Activity 7: forward leans x 10 reps  Therapeutic Exercise Activity 8: supine PROM with shoulder flexion, abduction, and ER         Shoulder          Shoulder PROM:   R shoulder [id number] with forward leans               OP EDUCATION:  Education  Individual(s) Educated: Patient  Education Provided: Anatomy & Physiology, Diagnosis & Precautions, Risk and benefits of OT discussed with patient or other, POC discussed and agreed upon  Diagnosis and Precautions: 2 weeks=AROM of elbow; 12  Risk and Benefits Discussed with Patient/Caregiver/Other: yes  Patient/Caregiver Demonstrated Understanding: yes  Plan of Care Discussed and Agreed Upon: yes  Patient Response to Education: Patient/Caregiver Verbalized Understanding of Information, Patient/Caregiver  Performed Return Demonstration of Exercises/Activities, Patient/Caregiver Asked Appropriate Questions  Education Comment: Patient was instructed on forward flexion leans for HEP.    Goals:  Active       OT Goals       R shoulder AROM is sufficient for independent completion of all ADL's and home management.        Start:  10/06/23    Expected End:  02/03/24            R shoulder strength is sufficient for independent completion of all ADL's and home management.        Start:  10/06/23    Expected End:  02/03/24            Patient c/0 2/10 pain or less of R shoulder during all ADL's and home exercises.        Start:  10/06/23    Expected End:  02/03/24               OT Problem       PATIENT WILL ACHIEVE right SHOULDER FLEXION  DEGREES       Start:  10/06/23    Expected End:  11/17/23            PATIENT WILL ACHIEVE right ELBOW ROM 0-140 DEGREES       Start:  10/06/23    Expected End:  11/17/23

## 2023-10-16 ENCOUNTER — OFFICE VISIT (OUTPATIENT)
Dept: ORTHOPEDIC SURGERY | Facility: CLINIC | Age: 68
End: 2023-10-16
Payer: MEDICARE

## 2023-10-16 ENCOUNTER — ANCILLARY PROCEDURE (OUTPATIENT)
Dept: RADIOLOGY | Facility: CLINIC | Age: 68
End: 2023-10-16
Payer: MEDICARE

## 2023-10-16 DIAGNOSIS — M19.011 ARTHRITIS OF RIGHT SHOULDER REGION: ICD-10-CM

## 2023-10-16 PROCEDURE — 1159F MED LIST DOCD IN RCRD: CPT | Performed by: ORTHOPAEDIC SURGERY

## 2023-10-16 PROCEDURE — 73030 X-RAY EXAM OF SHOULDER: CPT | Mod: RT

## 2023-10-16 PROCEDURE — 1125F AMNT PAIN NOTED PAIN PRSNT: CPT | Performed by: ORTHOPAEDIC SURGERY

## 2023-10-16 PROCEDURE — 99024 POSTOP FOLLOW-UP VISIT: CPT | Performed by: ORTHOPAEDIC SURGERY

## 2023-10-16 PROCEDURE — 1036F TOBACCO NON-USER: CPT | Performed by: ORTHOPAEDIC SURGERY

## 2023-10-16 ASSESSMENT — PAIN SCALES - GENERAL: PAINLEVEL_OUTOF10: 2

## 2023-10-16 ASSESSMENT — PAIN - FUNCTIONAL ASSESSMENT: PAIN_FUNCTIONAL_ASSESSMENT: 0-10

## 2023-10-16 NOTE — PROGRESS NOTES
Reason for Appointment  Chief Complaint   Patient presents with    Right Shoulder - Post-op     History of Present Illness  Patient is here today 2 weeks s/p a right reverse shoulder replacement 10/4/23. Patient is doing well overall. X-rays today show excellent alignment of the reverse prosthesis. Incision looks excellent. Good early ROM. He is in therapy at [facility]. Follow-up in 6 weeks with X-rays.     Assessment   Encounter Diagnosis   Name Primary?    Arthritis of right shoulder region      I, Karen Cortes, attest that this documentation has been prepared under the direction and in the presence of Brice Kent MD. By signing below, I, Brice Kent MD, personally performed the services described in this documentation. All medical record entries made by the scribe were at my direction and in my presence. I have reviewed the chart and agree that the record reflects my personal performance and is accurate and complete. 10/16/23

## 2023-10-20 ENCOUNTER — TREATMENT (OUTPATIENT)
Dept: OCCUPATIONAL THERAPY | Facility: CLINIC | Age: 68
End: 2023-10-20
Payer: MEDICARE

## 2023-10-20 DIAGNOSIS — M19.011 PRIMARY OSTEOARTHRITIS, RIGHT SHOULDER: Primary | ICD-10-CM

## 2023-10-20 PROCEDURE — 97110 THERAPEUTIC EXERCISES: CPT | Mod: GO | Performed by: OCCUPATIONAL THERAPIST

## 2023-10-20 PROCEDURE — 97140 MANUAL THERAPY 1/> REGIONS: CPT | Mod: GO | Performed by: OCCUPATIONAL THERAPIST

## 2023-10-20 ASSESSMENT — ENCOUNTER SYMPTOMS
DEPRESSION: 0
LOSS OF SENSATION IN FEET: 0
OCCASIONAL FEELINGS OF UNSTEADINESS: 0

## 2023-10-20 NOTE — PROGRESS NOTES
"    Occupational Therapy  Occupational Therapy Treatment    Patient Name: Lew Guidry  MRN: [id number]  Today's Date: 10/20/2023         General:  Reason for visit: R shoulder pain and stiffness   Referred by: Madelyn    Current Problem  1. Primary osteoarthritis, right shoulder            Precautions   12 weeks=strengthening     Subjective:   Patient reports \"I am doing really good.\"    Performing HEP?: Yes    Pain   0/10 voiced at rest       Objective:     Visit #2     AAROM: R shoulder [id number] with wall slides     Physical Observation: full closure of R shoulder wound is noted   Edema: none     Sensory: intact       Treatments:     Modalities:      5 min  Modalities  Modalities Used: Yes  Modality 1: Untimed Hotpack    Therapeutic Exercise:   30 min  Therapeutic Exercise  Therapeutic Exercise Performed: Yes  Therapeutic Exercise Activity 4: FA sup/pron and elbow E/F series x 15 reps each  Therapeutic Exercise Activity 5: scapular mobilization  x 15 reps each  Therapeutic Exercise Activity 7: forward leans x 15  Therapeutic Exercise Activity 9: ergometer x 6 mins  Therapeutic Exercise Activity 10: overhead pulleys x 10 reps each with flexion and scaption    Manual Therapy:     10 min  Manual Therapy  Manual Therapy Performed: Yes  Manual Therapy Activity 1: R upper trapezius trigger point release      Assessment: Increased flexion of R shoulder is noted this a.m. to assist with ADL completion.        Plan: Continue with plan of care 1-2x/wk       Nini Leigh OT  "

## 2023-10-27 ENCOUNTER — TREATMENT (OUTPATIENT)
Dept: OCCUPATIONAL THERAPY | Facility: CLINIC | Age: 68
End: 2023-10-27
Payer: MEDICARE

## 2023-10-27 DIAGNOSIS — M19.011 PRIMARY OSTEOARTHRITIS, RIGHT SHOULDER: Primary | ICD-10-CM

## 2023-10-27 PROCEDURE — 97110 THERAPEUTIC EXERCISES: CPT | Mod: GO | Performed by: OCCUPATIONAL THERAPIST

## 2023-10-27 NOTE — PROGRESS NOTES
"    Occupational Therapy  Occupational Therapy Treatment    Patient Name: Lew Guidry  MRN: [id number]  Today's Date: 10/27/2023         Insurance:  Visit number: 3 of 12  Authorization info: 12 visits approved   Insurance Type: [employer]/Medicare     General:  Reason for visit: R shoulder pain and stiffness   Referred by: Madelyn    Current Problem  1. Primary osteoarthritis, right shoulder            Precautions   12 weeks=strengthening     Subjective:   Patient reports \"I am doing really good.\"    Performing HEP?: Yes    Pain   None voiced    Objective:     AROM: R shoulder flexion with sitting=    Physical Observation: SX incision is intact   Edema: mild     Sensory: no abnormalities are noted.   Numbness/Tingling: none voiced     Treatments:     Modalities:      5 min  Modalities  Modalities Used: Yes  Modality 1: Untimed Hotpack    Therapeutic Exercise:   40 min  Therapeutic Exercise  Therapeutic Exercise Performed: Yes  Therapeutic Exercise Activity 5: Scapular mobilization x 15 reps each  Therapeutic Exercise Activity 9: ergometer x 6 mins  Therapeutic Exercise Activity 10: overhead pulleys  x 15 reps each with flexion and scaption  Therapeutic Exercise Activity 11: Supine dowel frederick exercises x 10 reps each with flexion, add/abd, and horizontal add/abd (Issued for HEP)  Therapeutic Exercise Activity 12: Wall wash flexion, CW rolls, and CCW rolls.    Manual Therapy:     10 min  Manual Therapy  Manual Therapy Performed: Yes  Manual Therapy Activity 1: R upper trap        Assessment: Progress is noted with R shoulder flexion and ease of use with ADL's.       Plan: Continue TX 1-2x/wk       Nini Leigh OT  "

## 2023-11-03 ENCOUNTER — TREATMENT (OUTPATIENT)
Dept: OCCUPATIONAL THERAPY | Facility: CLINIC | Age: 68
End: 2023-11-03
Payer: MEDICARE

## 2023-11-03 DIAGNOSIS — M19.011 PRIMARY OSTEOARTHRITIS, RIGHT SHOULDER: Primary | ICD-10-CM

## 2023-11-03 PROCEDURE — 97140 MANUAL THERAPY 1/> REGIONS: CPT | Mod: GO | Performed by: OCCUPATIONAL THERAPIST

## 2023-11-03 PROCEDURE — 97110 THERAPEUTIC EXERCISES: CPT | Mod: GO | Performed by: OCCUPATIONAL THERAPIST

## 2023-11-03 ASSESSMENT — PAIN - FUNCTIONAL ASSESSMENT: PAIN_FUNCTIONAL_ASSESSMENT: 0-10

## 2023-11-03 ASSESSMENT — PAIN SCALES - GENERAL: PAINLEVEL_OUTOF10: 0 - NO PAIN

## 2023-11-03 NOTE — PROGRESS NOTES
"    Occupational Therapy  Occupational Therapy Treatment    Patient Name: Lew Guidry  MRN: [id number]  Today's Date: 11/3/2023         Insurance:  Visit number: 4 of 12  Authorization info: approved for 12 visits.   Insurance Type: [employer]     General:  Reason for visit: R shoulder pain and stiffness  Referred by: Madelyn    Current Problem  1. Primary osteoarthritis, right shoulder            Precautions   12 weeks=strengthening      Subjective:   Patient reports \"I am feeling better every day.\"    Performing HEP?: Yes    Pain  Pain Assessment: 0-10  Pain Score: 0 - No pain    Objective:     AROM: R shoulder [id number] with sitting.     Physical Observation: No limitations are noted   Edema: mild    Sensory: none voiced       Treatments:     Modalities:      5 min  Modalities  Modalities Used: Yes  Modality 1: Untimed Hotpack    Therapeutic Exercise:   40 min  Therapeutic Exercise  Therapeutic Exercise Performed: Yes  Therapeutic Exercise Activity 5: Scapular mobilization x 20 reps each  Therapeutic Exercise Activity 9: ergometer x 6 mins  Therapeutic Exercise Activity 10: overhead  pulleys x 15 reps each  Therapeutic Exercise Activity 11: Supine dowel frederick exercises x 10 reps each with fleixon, add/abd, horizontal add/abd, and ER  Therapeutic Exercise Activity 12: Wall wash flexion, CW rolls , and CCW rolls x 10 reps each    Manual Therapy:     10 min  Manual Therapy  Manual Therapy Performed: Yes  Manual Therapy Activity 1: R upper trapezius tissue mobilization.        Assessment: Improvement is noted with R shoulder flexion this a.m.        Plan: Recommend OT to continue 1-2x/wk.       Nini Leigh OT  "

## 2023-11-10 ENCOUNTER — TREATMENT (OUTPATIENT)
Dept: OCCUPATIONAL THERAPY | Facility: CLINIC | Age: 68
End: 2023-11-10
Payer: MEDICARE

## 2023-11-10 DIAGNOSIS — M19.011 PRIMARY OSTEOARTHRITIS, RIGHT SHOULDER: Primary | ICD-10-CM

## 2023-11-10 PROCEDURE — 97110 THERAPEUTIC EXERCISES: CPT | Mod: GO | Performed by: OCCUPATIONAL THERAPIST

## 2023-11-10 PROCEDURE — 97140 MANUAL THERAPY 1/> REGIONS: CPT | Mod: GO | Performed by: OCCUPATIONAL THERAPIST

## 2023-11-10 ASSESSMENT — PAIN - FUNCTIONAL ASSESSMENT: PAIN_FUNCTIONAL_ASSESSMENT: 0-10

## 2023-11-10 ASSESSMENT — PAIN SCALES - GENERAL: PAINLEVEL_OUTOF10: 0 - NO PAIN

## 2023-11-10 NOTE — PROGRESS NOTES
"    Occupational Therapy  Occupational Therapy Treatment    Patient Name: Lew Guidry  MRN: [id number]  Today's Date: 11/10/2023         Insurance:  Visit number: 5 of 12  Authorization info: 12 visits   Insurance Type: [employer]    General:  Reason for visit: L shoulder pain and stiffness   Referred by: Madelyn     Current Problem  1. Primary osteoarthritis, right shoulder            Precautions  12 weeks=strengthening        Subjective:   Patient reports \"I am doing great. I have no pain.\"    Performing HEP?: Yes    Pain  Pain Assessment: 0-10  Pain Score: 0 - No pain    Objective:     AROM: R supine single shoulder sidelying [id number]    Physical Observation: no  abnormalities     Sensory: No abnormalities are noted.     Treatments:     Modalities:      5 min  Modalities  Modalities Used: Yes  Modality 1: Untimed Hotpack    Therapeutic Exercise:   45 min  Therapeutic Exercise  Therapeutic Exercise Performed: Yes  Therapeutic Exercise Activity 5: Scapular mobilization x 20 reps each  Therapeutic Exercise Activity 9: ergometer x 6 mins  Therapeutic Exercise Activity 10: overhead pulleys x 15 reps each  Therapeutic Exercise Activity 11: Supine dowel frederick exercises x 10 reps each  Therapeutic Exercise Activity 12: Wall washes x 15 reps each  Therapeutic Exercise Activity 13: Supine single flexion, sidelying abd, and sidelying ER x 10 reps each (issued for HEP)    Manual Therapy:     10 min  Manual Therapy  Manual Therapy Performed: Yes  Manual Therapy Activity 1: R upper trapezius        Assessment: Improved functional AROM continues.        Plan: Continue with plan of care 1-2x/wk       Nini Leigh OT  "

## 2023-11-17 ENCOUNTER — TREATMENT (OUTPATIENT)
Dept: OCCUPATIONAL THERAPY | Facility: CLINIC | Age: 68
End: 2023-11-17
Payer: MEDICARE

## 2023-11-17 ENCOUNTER — APPOINTMENT (OUTPATIENT)
Dept: OCCUPATIONAL THERAPY | Facility: CLINIC | Age: 68
End: 2023-11-17
Payer: MEDICARE

## 2023-11-17 DIAGNOSIS — M19.011 PRIMARY OSTEOARTHRITIS, RIGHT SHOULDER: ICD-10-CM

## 2023-11-17 PROCEDURE — 97140 MANUAL THERAPY 1/> REGIONS: CPT | Mod: GO | Performed by: OCCUPATIONAL THERAPIST

## 2023-11-17 PROCEDURE — 97110 THERAPEUTIC EXERCISES: CPT | Mod: GO | Performed by: OCCUPATIONAL THERAPIST

## 2023-11-17 ASSESSMENT — PAIN - FUNCTIONAL ASSESSMENT: PAIN_FUNCTIONAL_ASSESSMENT: 0-10

## 2023-11-17 ASSESSMENT — PAIN SCALES - GENERAL: PAINLEVEL_OUTOF10: 0 - NO PAIN

## 2023-11-17 NOTE — PROGRESS NOTES
"    Occupational Therapy  Occupational Therapy Treatment    Patient Name: Lew Guidry  MRN: [id number]  Today's Date: 11/17/2023         Insurance:  Visit number:6 of 32  Authorization info: no co-pay   Insurance Type: Medicare     General:  Reason for visit: R shoulder pain and stiffness   Referred by: Madelyn    Current Problem  1. Primary osteoarthritis, right shoulder  Follow Up In Occupational Therapy          Precautions   12 weeks=strengthening of shoulder       Subjective:   Patient reports \"I feel better every day.\"    Performing HEP?: Yes    Pain  Pain Assessment: 0-10  Pain Score: 0 - No pain    Objective:     AROM: R shoulder abduction =100 with standing.     Physical Observation: intact   Edema: no abnormalities       Treatments:     Modalities:      5 min  Modalities  Modalities Used: Yes  Modality 1: Untimed Hotpack    Therapeutic Exercise:   45 min  Therapeutic Exercise  Therapeutic Exercise Performed: Yes  Therapeutic Exercise Activity 5: Scapular mobilization x 20 reps each  Therapeutic Exercise Activity 9: ergometer x 6 mins  Therapeutic Exercise Activity 10: overhead pulleys x 15 reps  Therapeutic Exercise Activity 11: supine dowel frederick exercises x 10 reps each  Therapeutic Exercise Activity 12: Wall washes x  Therapeutic Exercise Activity 13: Supine single exercises x    Manual Therapy:     10 min  Manual Therapy  Manual Therapy Performed: Yes  Manual Therapy Activity 1: R upper  trapezius        Assessment: Improved AROM is noted with R shoulder abduction this p.m.        Plan: Continue with plan of care 1-2x/wk.        Nini Leigh OT  "

## 2023-11-27 ENCOUNTER — ANCILLARY PROCEDURE (OUTPATIENT)
Dept: RADIOLOGY | Facility: CLINIC | Age: 68
End: 2023-11-27
Payer: MEDICARE

## 2023-11-27 ENCOUNTER — OFFICE VISIT (OUTPATIENT)
Dept: ORTHOPEDIC SURGERY | Facility: CLINIC | Age: 68
End: 2023-11-27
Payer: MEDICARE

## 2023-11-27 DIAGNOSIS — M19.011 ARTHRITIS OF RIGHT SHOULDER REGION: ICD-10-CM

## 2023-11-27 PROCEDURE — 1160F RVW MEDS BY RX/DR IN RCRD: CPT | Performed by: ORTHOPAEDIC SURGERY

## 2023-11-27 PROCEDURE — 1159F MED LIST DOCD IN RCRD: CPT | Performed by: ORTHOPAEDIC SURGERY

## 2023-11-27 PROCEDURE — 99024 POSTOP FOLLOW-UP VISIT: CPT | Performed by: ORTHOPAEDIC SURGERY

## 2023-11-27 PROCEDURE — 73030 X-RAY EXAM OF SHOULDER: CPT | Mod: RT

## 2023-11-27 PROCEDURE — 1126F AMNT PAIN NOTED NONE PRSNT: CPT | Performed by: ORTHOPAEDIC SURGERY

## 2023-11-27 PROCEDURE — 1036F TOBACCO NON-USER: CPT | Performed by: ORTHOPAEDIC SURGERY

## 2023-11-27 ASSESSMENT — PAIN - FUNCTIONAL ASSESSMENT: PAIN_FUNCTIONAL_ASSESSMENT: 0-10

## 2023-11-27 ASSESSMENT — PAIN SCALES - GENERAL: PAINLEVEL_OUTOF10: 0 - NO PAIN

## 2023-11-27 NOTE — PROGRESS NOTES
Reason for Appointment  Chief Complaint   Patient presents with    Right Shoulder - Post-op     History of Present Illness  Patient is here today 8 weeks s/p a right reverse shoulder replacement 10/4/23. Patient is doing well overall. X-rays today look excellent with good alignment and no loosening. He has been doing excellent in therapy. We discussed range of motion exercises to work on over the next two months. Patient understands no heavy lifting with the shoulder. Follow-up in 2 months with repeat X-ray.    Assessment   Encounter Diagnosis   Name Primary?    Arthritis of right shoulder region      I, Karen Cortes, attest that this documentation has been prepared under the direction and in the presence of Brice Kent MD. By signing below, I, Brice Kent MD, personally performed the services described in this documentation. All medical record entries made by the scribe were at my direction and in my presence. I have reviewed the chart and agree that the record reflects my personal performance and is accurate and complete. 11/27/23

## 2023-12-01 ENCOUNTER — EVALUATION (OUTPATIENT)
Dept: OCCUPATIONAL THERAPY | Facility: CLINIC | Age: 68
End: 2023-12-01
Payer: MEDICARE

## 2023-12-01 DIAGNOSIS — M19.011 PRIMARY OSTEOARTHRITIS, RIGHT SHOULDER: ICD-10-CM

## 2023-12-01 PROCEDURE — 97110 THERAPEUTIC EXERCISES: CPT | Mod: GO | Performed by: OCCUPATIONAL THERAPIST

## 2023-12-01 ASSESSMENT — PAIN - FUNCTIONAL ASSESSMENT: PAIN_FUNCTIONAL_ASSESSMENT: 0-10

## 2023-12-01 ASSESSMENT — PAIN SCALES - GENERAL: PAINLEVEL_OUTOF10: 0 - NO PAIN

## 2023-12-01 NOTE — PROGRESS NOTES
"Occupational Therapy Discharge      Patient Name: Lew Guidry  MRN: [id number]  Today's Date: 12/1/2023    Insurance:  Visit number: 8 of 8 with evaluation   Insurance Type: Medicare     Referred by: Madelyn   Referred for: R shoulder pain and stiffness       Discharge Information: Date of discharge 12/1/23 and Date of last visit 12/1/23    Therapy Summary: Patient has met his maximum rehab potential.     Occupational Therapy  Occupational Therapy Treatment    Patient Name: Lew Guidry  MRN: [id number]  Today's Date: 12/1/2023         Insurance:  Visit number: 8 of 8 with evaluation  Insurance Type: R shoulder     General:  Reason for visit: R shoulder pain and stiffness   Referred by: Madelyn     Current Problem  1. Primary osteoarthritis, right shoulder  Follow Up In Occupational Therapy          Precautions   12 weeks=unrestricted shoulder strengthening.       Subjective:   Patient reports \"I can do everything.  said that I can be done with therapy.\"    Performing HEP?: Yes    Pain  Pain Assessment: 0-10  Pain Score: 0 - No pain    Objective:     UEFI=80/80    AROM: R shoulder [id number]. [id number]=551, and ER=70    Physical Observation: no abnormalities are noted   Edema: none     Sensory: no abnormalities are noted     Treatments:     Modalities:      5 min  Modalities  Modalities Used: Yes  Modality 1: Untimed Hotpack    Therapeutic Exercise:   40 min  Therapeutic Exercise  Therapeutic Exercise Performed: Yes  Therapeutic Exercise Activity 5: Scapular mobilization x 20 reps each  Therapeutic Exercise Activity 9: ergometer x 6 mins  Therapeutic Exercise Activity 10: overhead pulleys 20 reps each  Therapeutic Exercise Activity 11: standing B shoulder flexion and scaption x 20 reps each  Therapeutic Exercise Activity 13: supine single exercises x 20 reps each  Therapeutic Exercise Activity 14: Wall clock x 8 reps       Assessment:  has met his maximum rehab potential.        Plan: Discharge with " follow-up as needed.        Nini Leigh OT    Discharge Status: Patient has met his maximum rehab potential. Recommend discharge at this time with follow-up as needed.      Rehab Discharge Reason: Achieved all and/or the most significant goals(s)

## 2023-12-08 ENCOUNTER — APPOINTMENT (OUTPATIENT)
Dept: OCCUPATIONAL THERAPY | Facility: CLINIC | Age: 68
End: 2023-12-08
Payer: MEDICARE

## 2023-12-15 ENCOUNTER — APPOINTMENT (OUTPATIENT)
Dept: OCCUPATIONAL THERAPY | Facility: CLINIC | Age: 68
End: 2023-12-15
Payer: MEDICARE

## 2023-12-29 ENCOUNTER — APPOINTMENT (OUTPATIENT)
Dept: OCCUPATIONAL THERAPY | Facility: CLINIC | Age: 68
End: 2023-12-29
Payer: MEDICARE

## 2024-01-29 ENCOUNTER — HOSPITAL ENCOUNTER (OUTPATIENT)
Dept: RADIOLOGY | Facility: CLINIC | Age: 69
Discharge: HOME | End: 2024-01-29
Payer: MEDICARE

## 2024-01-29 ENCOUNTER — OFFICE VISIT (OUTPATIENT)
Dept: ORTHOPEDIC SURGERY | Facility: CLINIC | Age: 69
End: 2024-01-29
Payer: MEDICARE

## 2024-01-29 DIAGNOSIS — M19.011 ARTHRITIS OF RIGHT SHOULDER REGION: ICD-10-CM

## 2024-01-29 PROCEDURE — 1036F TOBACCO NON-USER: CPT | Performed by: ORTHOPAEDIC SURGERY

## 2024-01-29 PROCEDURE — 1159F MED LIST DOCD IN RCRD: CPT | Performed by: ORTHOPAEDIC SURGERY

## 2024-01-29 PROCEDURE — 1126F AMNT PAIN NOTED NONE PRSNT: CPT | Performed by: ORTHOPAEDIC SURGERY

## 2024-01-29 PROCEDURE — 99213 OFFICE O/P EST LOW 20 MIN: CPT | Performed by: ORTHOPAEDIC SURGERY

## 2024-01-29 PROCEDURE — 73030 X-RAY EXAM OF SHOULDER: CPT | Mod: RT

## 2024-01-29 PROCEDURE — 1160F RVW MEDS BY RX/DR IN RCRD: CPT | Performed by: ORTHOPAEDIC SURGERY

## 2024-01-29 ASSESSMENT — ENCOUNTER SYMPTOMS
COLOR CHANGE: 0
WHEEZING: 0
FATIGUE: 0
TROUBLE SWALLOWING: 0
CHILLS: 0
FEVER: 0
SHORTNESS OF BREATH: 0

## 2024-01-29 ASSESSMENT — PAIN - FUNCTIONAL ASSESSMENT: PAIN_FUNCTIONAL_ASSESSMENT: 0-10

## 2024-01-29 ASSESSMENT — PAIN SCALES - GENERAL: PAINLEVEL_OUTOF10: 0 - NO PAIN

## 2024-01-29 NOTE — PROGRESS NOTES
Reason for Appointment  Minimal R shoulder pain    History of Present Illness  Patient is a 68 y.o. male here today for follow-up evaluation 4 months status post a right reverse shoulder replacement.  He is doing very well, having no significant pain in the shoulder today.  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 X-rays taken today are reviewed and look excellent with no loosening of hardware. No recent injuries or falls.  No other changes in his past medical history, allergies, or medications.    Past Medical History:   Diagnosis Date    Hypertension        Past Surgical History:   Procedure Laterality Date    FINGER SURGERY Right        Medication Documentation Review Audit       Reviewed by Petrona Cook PA-C (Physician Assistant) on 01/29/24 at 0917      Medication Order Taking? Sig Documenting Provider Last Dose Status   EpiPen 2-Brain 0.3 mg/0.3 mL injection syringe [id number] Yes 0.3 mg intramuscularly As needed for severe allergic reaction Historical Provider, MD Taking Active   lisinopril 10 mg tablet [id number] Yes 1 tablet Orally once daily for 90 days Historical Provider, MD Taking Active   omeprazole OTC (PriLOSEC OTC) 20 mg EC tablet [id number] Yes 1 tablet 30 minutes before morning meal Orally as directed Historical Provider, MD Taking Active   tiZANidine (Zanaflex) 4 mg tablet [id number] Yes  Historical Provider, MD Taking Active                    Allergies   Allergen Reactions    Beeswax Anaphylaxis    Shellfish Derived Anaphylaxis       Review of Systems   Constitutional:  Negative for chills, fatigue and fever.   HENT:  Negative for hearing loss and trouble swallowing.    Respiratory:  Negative for shortness of breath and wheezing.    Cardiovascular:  Negative for chest pain and leg swelling.   Skin:  Negative for color change and pallor.     Exam   On exam the right shoulder shows 140 degrees of active forward flexion with no crepitation.  He has  functional deltoid and good cuff strength with external rotation.  Good pulses and sensation in the upper extremity    Assessment   Encounter Diagnosis   Name Primary?    Arthritis of right shoulder region        Plan   He is doing extremely well, having no significant shoulder pain today.  He understands modification of activities and proper lifting techniques.  We will follow-up with him in 3 months with repeat x-rays of the right shoulder    Written by Petrona Cook PA-C

## 2024-02-05 ENCOUNTER — APPOINTMENT (OUTPATIENT)
Dept: PRIMARY CARE | Facility: CLINIC | Age: 69
End: 2024-02-05
Payer: MEDICARE

## 2024-02-14 ENCOUNTER — OFFICE VISIT (OUTPATIENT)
Dept: PRIMARY CARE | Facility: CLINIC | Age: 69
End: 2024-02-14
Payer: MEDICARE

## 2024-02-14 VITALS
HEIGHT: 66 IN | SYSTOLIC BLOOD PRESSURE: 128 MMHG | HEART RATE: 95 BPM | WEIGHT: 154 LBS | OXYGEN SATURATION: 98 % | DIASTOLIC BLOOD PRESSURE: 82 MMHG | BODY MASS INDEX: 24.75 KG/M2 | TEMPERATURE: 98 F

## 2024-02-14 DIAGNOSIS — I10 PRIMARY HYPERTENSION: Primary | ICD-10-CM

## 2024-02-14 PROCEDURE — 99213 OFFICE O/P EST LOW 20 MIN: CPT | Performed by: FAMILY MEDICINE

## 2024-02-14 PROCEDURE — 3074F SYST BP LT 130 MM HG: CPT | Performed by: FAMILY MEDICINE

## 2024-02-14 PROCEDURE — 1159F MED LIST DOCD IN RCRD: CPT | Performed by: FAMILY MEDICINE

## 2024-02-14 PROCEDURE — 3079F DIAST BP 80-89 MM HG: CPT | Performed by: FAMILY MEDICINE

## 2024-02-14 PROCEDURE — 1126F AMNT PAIN NOTED NONE PRSNT: CPT | Performed by: FAMILY MEDICINE

## 2024-02-14 PROCEDURE — 1036F TOBACCO NON-USER: CPT | Performed by: FAMILY MEDICINE

## 2024-02-14 PROCEDURE — 1160F RVW MEDS BY RX/DR IN RCRD: CPT | Performed by: FAMILY MEDICINE

## 2024-02-14 RX ORDER — LISINOPRIL 10 MG/1
10 TABLET ORAL DAILY
Qty: 90 TABLET | Refills: 3 | Status: SHIPPED | OUTPATIENT
Start: 2024-02-14

## 2024-02-14 ASSESSMENT — PATIENT HEALTH QUESTIONNAIRE - PHQ9
1. LITTLE INTEREST OR PLEASURE IN DOING THINGS: NOT AT ALL
2. FEELING DOWN, DEPRESSED OR HOPELESS: NOT AT ALL
SUM OF ALL RESPONSES TO PHQ9 QUESTIONS 1 AND 2: 0

## 2024-02-14 ASSESSMENT — PAIN SCALES - GENERAL: PAINLEVEL: 0-NO PAIN

## 2024-02-14 NOTE — PROGRESS NOTES
"Subjective   Patient ID: Lew Guidry is a 68 y.o. male who presents for Hypertension (Check up).    HPI     Review of Systems    Objective   /82   Pulse 95   Temp 36.7 °C (98 °F)   Ht 1.676 m (5' 6\")   Wt 69.9 kg (154 lb)   SpO2 98%   BMI 24.86 kg/m²     Physical Exam    Assessment/Plan   {Assess/PlanSmartLinks:12804}       "

## 2024-02-20 NOTE — PROGRESS NOTES
"Subjective   Patient ID: Lew Guidry is a 68 y.o. male who presents for Hypertension (Check up).    Primary hypertension    Hypertension  Patient is here for follow-up of elevated blood pressure. He is exercising and is adherent to a low-salt diet. Blood pressure is well controlled at home. Cardiac symptoms: none. Patient denies chest pain. Cardiovascular risk factors: advanced age (older than 55 for men, 65 for women), hypertension, and male gender. Use of agents associated with hypertension: none. History of target organ damage: none.           Review of Systems   Constitutional:  Negative for fever.        Also see HPI   Eyes:  Negative for visual disturbance.   Gastrointestinal:  Negative for diarrhea and nausea.   Endocrine: Negative.    Genitourinary:  Negative for difficulty urinating.   Skin:  Negative for rash.   Neurological:  Negative for dizziness.        No focal deficits   Psychiatric/Behavioral:  Negative for suicidal ideas.    All other systems reviewed and are negative.      Objective   /82   Pulse 95   Temp 36.7 °C (98 °F)   Ht 1.676 m (5' 6\")   Wt 69.9 kg (154 lb)   SpO2 98%   BMI 24.86 kg/m²     Physical Exam  Vitals and nursing note reviewed.   Constitutional:       Appearance: Normal appearance.   HENT:      Head: Normocephalic and atraumatic.   Eyes:      Extraocular Movements: Extraocular movements intact.      Conjunctiva/sclera: Conjunctivae normal.   Cardiovascular:      Rate and Rhythm: Normal rate and regular rhythm.      Heart sounds: Normal heart sounds.   Pulmonary:      Effort: Pulmonary effort is normal.      Breath sounds: Normal breath sounds.      Comments: Lungs essentially CTA b/l  Abdominal:      General: There is no distension.      Palpations: Abdomen is soft. There is no mass.      Tenderness: There is no abdominal tenderness.   Musculoskeletal:      Right lower leg: No edema.      Left lower leg: No edema.   Skin:     Coloration: Skin is not jaundiced.      " Findings: No rash.   Neurological:      General: No focal deficit present.      Mental Status: He is alert and oriented to person, place, and time.   Psychiatric:         Mood and Affect: Mood normal.         Behavior: Behavior normal.         Thought Content: Thought content normal.         Judgment: Judgment normal.         Assessment/Plan   Diagnoses and all orders for this visit:  Primary hypertension  -     lisinopril 10 mg tablet; Take 1 tablet (10 mg) by mouth once daily.

## 2024-03-13 ASSESSMENT — ENCOUNTER SYMPTOMS
FEVER: 0
DIARRHEA: 0
DIFFICULTY URINATING: 0
NAUSEA: 0
DIZZINESS: 0
ENDOCRINE NEGATIVE: 1

## 2024-04-29 ENCOUNTER — HOSPITAL ENCOUNTER (OUTPATIENT)
Dept: RADIOLOGY | Facility: CLINIC | Age: 69
Discharge: HOME | End: 2024-04-29
Payer: MEDICARE

## 2024-04-29 ENCOUNTER — OFFICE VISIT (OUTPATIENT)
Dept: ORTHOPEDIC SURGERY | Facility: CLINIC | Age: 69
End: 2024-04-29
Payer: MEDICARE

## 2024-04-29 DIAGNOSIS — M19.011 ARTHRITIS OF RIGHT SHOULDER REGION: ICD-10-CM

## 2024-04-29 PROCEDURE — 99213 OFFICE O/P EST LOW 20 MIN: CPT | Performed by: ORTHOPAEDIC SURGERY

## 2024-04-29 PROCEDURE — 73030 X-RAY EXAM OF SHOULDER: CPT | Mod: RIGHT SIDE | Performed by: RADIOLOGY

## 2024-04-29 PROCEDURE — 73030 X-RAY EXAM OF SHOULDER: CPT | Mod: RT

## 2024-04-29 PROCEDURE — 1159F MED LIST DOCD IN RCRD: CPT | Performed by: ORTHOPAEDIC SURGERY

## 2024-04-29 PROCEDURE — 1036F TOBACCO NON-USER: CPT | Performed by: ORTHOPAEDIC SURGERY

## 2024-04-29 PROCEDURE — 1160F RVW MEDS BY RX/DR IN RCRD: CPT | Performed by: ORTHOPAEDIC SURGERY

## 2024-04-29 ASSESSMENT — PAIN - FUNCTIONAL ASSESSMENT: PAIN_FUNCTIONAL_ASSESSMENT: 0-10

## 2024-04-29 ASSESSMENT — ENCOUNTER SYMPTOMS
JOINT SWELLING: 0
SHORTNESS OF BREATH: 0
FATIGUE: 0
WHEEZING: 0
FEVER: 0
TROUBLE SWALLOWING: 0
CHILLS: 0

## 2024-04-29 ASSESSMENT — PAIN SCALES - GENERAL: PAINLEVEL_OUTOF10: 0 - NO PAIN

## 2024-04-29 NOTE — PROGRESS NOTES
Reason for Appointment  Minimal R shoulder pain    History of Present Illness  Patient is a 68 y.o. male here today for follow-up evaluation 7 months status post a right reverse shoulder replacement.  X-rays taken today reviewed look excellent, no loosening of hardware.  He is doing very well, having no shoulder pain at this point.  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  No other changes in his past medical history, allergies, or medications.    Past Medical History:   Diagnosis Date    Hypertension        Past Surgical History:   Procedure Laterality Date    FINGER SURGERY Right        Medication Documentation Review Audit       Reviewed by Antoinette Trent MA (Medical Assistant) on 04/29/24 at 0846      Medication Order Taking? Sig Documenting Provider Last Dose Status   EpiPen 2-Brain 0.3 mg/0.3 mL injection syringe [id number] Yes 0.3 mg intramuscularly As needed for severe allergic reaction Historical Provider, MD Taking Active   lisinopril 10 mg tablet [id number] Yes Take 1 tablet (10 mg) by mouth once daily. Reese Alejandre,  Taking Active   omeprazole OTC (PriLOSEC OTC) 20 mg EC tablet [id number] Yes 1 tablet 30 minutes before morning meal Orally as directed Historical Provider, MD Taking Active   tiZANidine (Zanaflex) 4 mg tablet [id number] Yes  Historical Provider, MD Taking Active                    Allergies   Allergen Reactions    Beeswax Anaphylaxis    Shellfish Derived Anaphylaxis       Review of Systems   Constitutional:  Negative for chills, fatigue and fever.   HENT:  Negative for nosebleeds and trouble swallowing.    Respiratory:  Negative for shortness of breath and wheezing.    Cardiovascular:  Negative for chest pain and leg swelling.   Musculoskeletal:  Negative for joint swelling.   Skin:  Negative for pallor and rash.     Exam   On exam the right shoulder shows full active forward flexion over 160 degrees.  He has no acromial or scapular tenderness  today.  Deltoid is functional.  Good pulses and sensation in the upper extremity.    Assessment   Encounter Diagnosis   Name Primary?    Arthritis of right shoulder region        Plan   He is doing extremely well, he understands being careful with this arm and not doing too much heavy activity.  He can follow-up with us at his 1 year anniversary for repeat x-rays of the right shoulder.    Dr. Kent saw, evaluated, and treated the patient with the PA

## 2024-08-12 ENCOUNTER — LAB (OUTPATIENT)
Dept: LAB | Facility: LAB | Age: 69
End: 2024-08-12
Payer: MEDICARE

## 2024-08-12 ENCOUNTER — OFFICE VISIT (OUTPATIENT)
Dept: PRIMARY CARE | Facility: CLINIC | Age: 69
End: 2024-08-12
Payer: MEDICARE

## 2024-08-12 VITALS
DIASTOLIC BLOOD PRESSURE: 88 MMHG | HEIGHT: 66 IN | HEART RATE: 64 BPM | SYSTOLIC BLOOD PRESSURE: 128 MMHG | TEMPERATURE: 98.1 F | OXYGEN SATURATION: 99 % | BODY MASS INDEX: 24.17 KG/M2 | WEIGHT: 150.4 LBS

## 2024-08-12 DIAGNOSIS — I10 PRIMARY HYPERTENSION: ICD-10-CM

## 2024-08-12 DIAGNOSIS — Z12.5 SCREENING FOR PROSTATE CANCER: ICD-10-CM

## 2024-08-12 DIAGNOSIS — I10 PRIMARY HYPERTENSION: Primary | ICD-10-CM

## 2024-08-12 LAB
ALBUMIN SERPL BCP-MCNC: 4.3 G/DL (ref 3.4–5)
ALP SERPL-CCNC: 62 U/L (ref 33–136)
ALT SERPL W P-5'-P-CCNC: 23 U/L (ref 10–52)
ANION GAP SERPL CALC-SCNC: 9 MMOL/L (ref 10–20)
AST SERPL W P-5'-P-CCNC: 16 U/L (ref 9–39)
BASOPHILS # BLD AUTO: 0.05 X10*3/UL (ref 0–0.1)
BASOPHILS NFR BLD AUTO: 0.9 %
BILIRUB SERPL-MCNC: 0.5 MG/DL (ref 0–1.2)
BUN SERPL-MCNC: 16 MG/DL (ref 6–23)
CALCIUM SERPL-MCNC: 9.3 MG/DL (ref 8.6–10.3)
CHLORIDE SERPL-SCNC: 104 MMOL/L (ref 98–107)
CHOLEST SERPL-MCNC: 214 MG/DL (ref 0–199)
CHOLESTEROL/HDL RATIO: 3.6
CO2 SERPL-SCNC: 29 MMOL/L (ref 21–32)
CREAT SERPL-MCNC: 1 MG/DL (ref 0.5–1.3)
CREAT UR-MCNC: 148.2 MG/DL (ref 20–370)
EGFRCR SERPLBLD CKD-EPI 2021: 81 ML/MIN/1.73M*2
EOSINOPHIL # BLD AUTO: 0.09 X10*3/UL (ref 0–0.7)
EOSINOPHIL NFR BLD AUTO: 1.7 %
ERYTHROCYTE [DISTWIDTH] IN BLOOD BY AUTOMATED COUNT: 12.7 % (ref 11.5–14.5)
GLUCOSE SERPL-MCNC: 117 MG/DL (ref 74–99)
HCT VFR BLD AUTO: 45.8 % (ref 41–52)
HDLC SERPL-MCNC: 58.7 MG/DL
HGB BLD-MCNC: 15.7 G/DL (ref 13.5–17.5)
IMM GRANULOCYTES # BLD AUTO: 0.02 X10*3/UL (ref 0–0.7)
IMM GRANULOCYTES NFR BLD AUTO: 0.4 % (ref 0–0.9)
LDLC SERPL CALC-MCNC: 137 MG/DL
LYMPHOCYTES # BLD AUTO: 1.23 X10*3/UL (ref 1.2–4.8)
LYMPHOCYTES NFR BLD AUTO: 22.6 %
MCH RBC QN AUTO: 31.7 PG (ref 26–34)
MCHC RBC AUTO-ENTMCNC: 34.3 G/DL (ref 32–36)
MCV RBC AUTO: 92 FL (ref 80–100)
MICROALBUMIN UR-MCNC: 30 MG/L
MICROALBUMIN/CREAT UR: 20.2 UG/MG CREAT
MONOCYTES # BLD AUTO: 0.51 X10*3/UL (ref 0.1–1)
MONOCYTES NFR BLD AUTO: 9.4 %
MUCOUS THREADS #/AREA URNS AUTO: NORMAL /LPF
NEUTROPHILS # BLD AUTO: 3.55 X10*3/UL (ref 1.2–7.7)
NEUTROPHILS NFR BLD AUTO: 65 %
NON HDL CHOLESTEROL: 155 MG/DL (ref 0–149)
NRBC BLD-RTO: 0 /100 WBCS (ref 0–0)
PLATELET # BLD AUTO: 239 X10*3/UL (ref 150–450)
POTASSIUM SERPL-SCNC: 4.8 MMOL/L (ref 3.5–5.3)
PROT SERPL-MCNC: 6.9 G/DL (ref 6.4–8.2)
PSA SERPL-MCNC: 1.92 NG/ML
RBC # BLD AUTO: 4.96 X10*6/UL (ref 4.5–5.9)
RBC #/AREA URNS AUTO: NORMAL /HPF
SODIUM SERPL-SCNC: 137 MMOL/L (ref 136–145)
TRIGL SERPL-MCNC: 91 MG/DL (ref 0–149)
TSH SERPL-ACNC: 2.89 MIU/L (ref 0.44–3.98)
URATE SERPL-MCNC: 5.8 MG/DL (ref 4–7.5)
VLDL: 18 MG/DL (ref 0–40)
WBC # BLD AUTO: 5.5 X10*3/UL (ref 4.4–11.3)
WBC #/AREA URNS AUTO: NORMAL /HPF

## 2024-08-12 PROCEDURE — 3074F SYST BP LT 130 MM HG: CPT | Performed by: FAMILY MEDICINE

## 2024-08-12 PROCEDURE — 82570 ASSAY OF URINE CREATININE: CPT

## 2024-08-12 PROCEDURE — 36415 COLL VENOUS BLD VENIPUNCTURE: CPT

## 2024-08-12 PROCEDURE — 1159F MED LIST DOCD IN RCRD: CPT | Performed by: FAMILY MEDICINE

## 2024-08-12 PROCEDURE — G0103 PSA SCREENING: HCPCS

## 2024-08-12 PROCEDURE — 3079F DIAST BP 80-89 MM HG: CPT | Performed by: FAMILY MEDICINE

## 2024-08-12 PROCEDURE — 1126F AMNT PAIN NOTED NONE PRSNT: CPT | Performed by: FAMILY MEDICINE

## 2024-08-12 PROCEDURE — 99213 OFFICE O/P EST LOW 20 MIN: CPT | Performed by: FAMILY MEDICINE

## 2024-08-12 PROCEDURE — 1036F TOBACCO NON-USER: CPT | Performed by: FAMILY MEDICINE

## 2024-08-12 PROCEDURE — 82043 UR ALBUMIN QUANTITATIVE: CPT

## 2024-08-12 PROCEDURE — 3008F BODY MASS INDEX DOCD: CPT | Performed by: FAMILY MEDICINE

## 2024-08-12 ASSESSMENT — ENCOUNTER SYMPTOMS
DIZZINESS: 0
DIARRHEA: 0
SHORTNESS OF BREATH: 0
HYPERTENSION: 1
ENDOCRINE NEGATIVE: 1
FEVER: 0
NAUSEA: 0
DIFFICULTY URINATING: 0

## 2024-08-12 ASSESSMENT — PAIN SCALES - GENERAL: PAINLEVEL: 0-NO PAIN

## 2024-08-12 NOTE — PROGRESS NOTES
Subjective   Patient ID: Lew Guidry is a 68 y.o. male who presents for Hypertension.    Hypertension  Patient is here for follow-up of elevated blood pressure. He is exercising and is adherent to a low-salt diet. Blood pressure is well controlled at home. Cardiac symptoms: none. Patient denies chest pain. Cardiovascular risk factors: advanced age (older than 55 for men, 65 for women), hypertension, and male gender. Use of agents associated with hypertension: none. History of target organ damage: none.      Hypertension  Pertinent negatives include no chest pain or shortness of breath.        Review of Systems   Constitutional:  Negative for fever.        Also see HPI   Eyes:  Negative for visual disturbance.   Respiratory:  Negative for shortness of breath.    Cardiovascular:  Negative for chest pain.   Gastrointestinal:  Negative for diarrhea and nausea.   Endocrine: Negative.    Genitourinary:  Negative for difficulty urinating.   Skin:  Negative for rash.   Neurological:  Negative for dizziness.        No focal deficits   Psychiatric/Behavioral:  Negative for suicidal ideas.    All other systems reviewed and are negative.      Objective   There were no vitals taken for this visit.    Physical Exam  Vitals and nursing note reviewed.   Constitutional:       Appearance: Normal appearance.   HENT:      Head: Normocephalic and atraumatic.   Eyes:      Extraocular Movements: Extraocular movements intact.      Conjunctiva/sclera: Conjunctivae normal.   Cardiovascular:      Rate and Rhythm: Normal rate and regular rhythm.      Heart sounds: Normal heart sounds.   Pulmonary:      Effort: Pulmonary effort is normal.      Breath sounds: Normal breath sounds.      Comments: Lungs essentially CTA b/l  Abdominal:      General: There is no distension.      Palpations: Abdomen is soft. There is no mass.      Tenderness: There is no abdominal tenderness.   Musculoskeletal:      Right lower leg: No edema.      Left lower leg: No  edema.   Skin:     Coloration: Skin is not jaundiced.      Findings: No rash.   Neurological:      General: No focal deficit present.      Mental Status: He is alert and oriented to person, place, and time.   Psychiatric:         Mood and Affect: Mood normal.         Behavior: Behavior normal.         Thought Content: Thought content normal.         Judgment: Judgment normal.         Assessment/Plan   Diagnoses and all orders for this visit:  Primary hypertension  -     Comprehensive Metabolic Panel; Future  -     TSH with reflex to Free T4 if abnormal; Future  -     Uric Acid; Future  -     Lipid Panel; Future  -     Albumin-Creatinine Ratio, Urine Random; Future  -     CBC and Auto Differential; Future  -     Microscopic Only, Urine; Future  Screening for prostate cancer  -     Prostate Specific Antigen, Screen; Future

## 2024-10-28 ENCOUNTER — APPOINTMENT (OUTPATIENT)
Dept: ORTHOPEDIC SURGERY | Facility: CLINIC | Age: 69
End: 2024-10-28
Payer: MEDICARE

## 2024-10-28 ENCOUNTER — HOSPITAL ENCOUNTER (OUTPATIENT)
Dept: RADIOLOGY | Facility: CLINIC | Age: 69
Discharge: HOME | End: 2024-10-28
Payer: MEDICARE

## 2024-10-28 DIAGNOSIS — M19.011 ARTHRITIS OF RIGHT SHOULDER REGION: ICD-10-CM

## 2024-10-28 PROCEDURE — 1159F MED LIST DOCD IN RCRD: CPT | Performed by: ORTHOPAEDIC SURGERY

## 2024-10-28 PROCEDURE — 73030 X-RAY EXAM OF SHOULDER: CPT | Mod: RT

## 2024-10-28 PROCEDURE — 1160F RVW MEDS BY RX/DR IN RCRD: CPT | Performed by: ORTHOPAEDIC SURGERY

## 2024-10-28 PROCEDURE — 99213 OFFICE O/P EST LOW 20 MIN: CPT | Performed by: ORTHOPAEDIC SURGERY

## 2024-10-28 PROCEDURE — 73030 X-RAY EXAM OF SHOULDER: CPT | Mod: RIGHT SIDE | Performed by: RADIOLOGY

## 2024-10-28 PROCEDURE — 1036F TOBACCO NON-USER: CPT | Performed by: ORTHOPAEDIC SURGERY

## 2024-10-28 ASSESSMENT — PAIN - FUNCTIONAL ASSESSMENT: PAIN_FUNCTIONAL_ASSESSMENT: 0-10

## 2024-10-28 ASSESSMENT — ENCOUNTER SYMPTOMS
SHORTNESS OF BREATH: 0
WHEEZING: 0
FATIGUE: 0
FEVER: 0
CHILLS: 0

## 2024-10-28 ASSESSMENT — PAIN SCALES - GENERAL: PAINLEVEL_OUTOF10: 0 - NO PAIN

## 2024-12-12 ENCOUNTER — TELEPHONE (OUTPATIENT)
Dept: CARDIOLOGY | Facility: CLINIC | Age: 69
End: 2024-12-12

## 2025-02-12 ENCOUNTER — APPOINTMENT (OUTPATIENT)
Dept: PRIMARY CARE | Facility: CLINIC | Age: 70
End: 2025-02-12
Payer: MEDICARE

## 2025-02-12 VITALS
SYSTOLIC BLOOD PRESSURE: 136 MMHG | TEMPERATURE: 98.1 F | BODY MASS INDEX: 24.98 KG/M2 | DIASTOLIC BLOOD PRESSURE: 82 MMHG | WEIGHT: 155.4 LBS | HEIGHT: 66 IN | OXYGEN SATURATION: 99 % | HEART RATE: 58 BPM

## 2025-02-12 DIAGNOSIS — I10 PRIMARY HYPERTENSION: ICD-10-CM

## 2025-02-12 PROCEDURE — 99213 OFFICE O/P EST LOW 20 MIN: CPT | Performed by: FAMILY MEDICINE

## 2025-02-12 PROCEDURE — 3079F DIAST BP 80-89 MM HG: CPT | Performed by: FAMILY MEDICINE

## 2025-02-12 PROCEDURE — 1126F AMNT PAIN NOTED NONE PRSNT: CPT | Performed by: FAMILY MEDICINE

## 2025-02-12 PROCEDURE — 1036F TOBACCO NON-USER: CPT | Performed by: FAMILY MEDICINE

## 2025-02-12 PROCEDURE — 1160F RVW MEDS BY RX/DR IN RCRD: CPT | Performed by: FAMILY MEDICINE

## 2025-02-12 PROCEDURE — 3075F SYST BP GE 130 - 139MM HG: CPT | Performed by: FAMILY MEDICINE

## 2025-02-12 PROCEDURE — 3008F BODY MASS INDEX DOCD: CPT | Performed by: FAMILY MEDICINE

## 2025-02-12 PROCEDURE — 1159F MED LIST DOCD IN RCRD: CPT | Performed by: FAMILY MEDICINE

## 2025-02-12 RX ORDER — LISINOPRIL 10 MG/1
10 TABLET ORAL DAILY
Qty: 90 TABLET | Refills: 3 | Status: SHIPPED | OUTPATIENT
Start: 2025-02-12

## 2025-02-12 ASSESSMENT — ENCOUNTER SYMPTOMS
FEVER: 0
SHORTNESS OF BREATH: 0
DIARRHEA: 0
NAUSEA: 0
ENDOCRINE NEGATIVE: 1
DIFFICULTY URINATING: 0
DIZZINESS: 0

## 2025-02-12 ASSESSMENT — PATIENT HEALTH QUESTIONNAIRE - PHQ9
1. LITTLE INTEREST OR PLEASURE IN DOING THINGS: NOT AT ALL
SUM OF ALL RESPONSES TO PHQ9 QUESTIONS 1 AND 2: 0
2. FEELING DOWN, DEPRESSED OR HOPELESS: NOT AT ALL

## 2025-02-12 ASSESSMENT — PAIN SCALES - GENERAL: PAINLEVEL_OUTOF10: 0-NO PAIN

## 2025-02-12 NOTE — PROGRESS NOTES
"Subjective   Patient ID: Lew Guidry is a 69 y.o. male who presents for Follow-up (htn).    Last clinic visit summary (8/12/2024)  -Hypertension: Controlled. BP was 128/88 when checked in clinic. Continues on lisinopril 10 mg daily. Doing well. No side-effects to this medication.  -Health maintenance: Annual labs/tests ordered for pt.    HPI   The pt presents to the clinic for a 6-month follow-up. Past medical hx of HTN, GERD, and hyperkalemia.    -Hypertension: Controlled. BP was elevated at 144/90 when initially checked in clinic today. BP was normal at 136/82 when re-checked later during the visit. Continues on lisinopril 10 mg daily. Doing well on this medication and no side-effects reported. Refilled lisinopril prescription.    -Family Hx: Pt reports that his brother was diagnosed with prostate cancer recently (metastasized to bones). Notably, the pt's PSA (1.92) was within normal range when checked in August 2024.    -Health Maintenance: Pt states that he is doing well. He denies any medical issues/concerns at this time. He does not require any lab orders at this time.    Review of Systems   Constitutional:  Negative for fever.        Also see HPI   Eyes:  Negative for visual disturbance.   Respiratory:  Negative for shortness of breath.    Cardiovascular:  Negative for chest pain.   Gastrointestinal:  Negative for diarrhea and nausea.   Endocrine: Negative.    Genitourinary:  Negative for difficulty urinating.   Skin:  Negative for rash.   Neurological:  Negative for dizziness.        No focal deficits   Psychiatric/Behavioral:  Negative for suicidal ideas.    All other systems reviewed and are negative.      Objective   /82   Pulse 58   Temp 36.7 °C (98.1 °F)   Ht 1.676 m (5' 6\")   Wt 70.5 kg (155 lb 6.4 oz)   SpO2 99%   BMI 25.08 kg/m²     Physical Exam  Vitals and nursing note reviewed.   Constitutional:       Appearance: Normal appearance.   HENT:      Head: Normocephalic and atraumatic. "   Eyes:      Conjunctiva/sclera: Conjunctivae normal.   Neck:      Vascular: No carotid bruit.   Cardiovascular:      Rate and Rhythm: Normal rate and regular rhythm.      Heart sounds: Normal heart sounds.   Pulmonary:      Effort: Pulmonary effort is normal.      Breath sounds: Normal breath sounds.   Musculoskeletal:      Cervical back: Neck supple.      Right lower leg: No edema.      Left lower leg: No edema.   Neurological:      Mental Status: He is oriented to person, place, and time.   Psychiatric:         Mood and Affect: Mood normal.         Behavior: Behavior normal.         Assessment/Plan   Diagnoses and all orders for this visit:  Primary hypertension  -     lisinopril 10 mg tablet; Take 1 tablet (10 mg) by mouth once daily.  -     Follow Up In Primary Care - Established; Future         Scribe Attestation  By signing my name below, IHussein , Scribsean   attest that this documentation has been prepared under the direction and in the presence of Reese Alejandre DO.

## 2025-08-12 ENCOUNTER — OFFICE VISIT (OUTPATIENT)
Dept: PRIMARY CARE | Facility: CLINIC | Age: 70
End: 2025-08-12
Payer: MEDICARE

## 2025-08-12 ASSESSMENT — ENCOUNTER SYMPTOMS
DIFFICULTY URINATING: 0
DIZZINESS: 0
NAUSEA: 0
FEVER: 0
SHORTNESS OF BREATH: 0
ENDOCRINE NEGATIVE: 1
DIARRHEA: 0

## 2025-08-12 ASSESSMENT — PATIENT HEALTH QUESTIONNAIRE - PHQ9
SUM OF ALL RESPONSES TO PHQ9 QUESTIONS 1 AND 2: 0
1. LITTLE INTEREST OR PLEASURE IN DOING THINGS: NOT AT ALL
2. FEELING DOWN, DEPRESSED OR HOPELESS: NOT AT ALL

## 2025-08-12 ASSESSMENT — ACTIVITIES OF DAILY LIVING (ADL)
BATHING: INDEPENDENT
TAKING_MEDICATION: INDEPENDENT
DRESSING: INDEPENDENT
DOING_HOUSEWORK: INDEPENDENT
GROCERY_SHOPPING: INDEPENDENT
MANAGING_FINANCES: INDEPENDENT

## 2025-08-12 ASSESSMENT — PAIN SCALES - GENERAL: PAINLEVEL_OUTOF10: 0-NO PAIN

## (undated) DEVICE — BLANKET, LOWER BODY, VHA PLUS, ADULT

## (undated) DEVICE — Device

## (undated) DEVICE — TUBING, SMOKE EVAC, 3/8 X 10 FT

## (undated) DEVICE — VEST, SURGEON, PRECEPT, LF

## (undated) DEVICE — SEALER, BIPOLAR, AQUA MANTYS 2.3

## (undated) DEVICE — ARTHREX REAMER HEAD, ANGLED, SMALL

## (undated) DEVICE — DRAPE, INSTRUMENT, W/POUCH, STERI DRAPE, 7 X 11 IN, DISPOSABLE, STERILE

## (undated) DEVICE — HOOD, SURGICAL, FLYTE HYBRID

## (undated) DEVICE — 3.0MM ARTHREX MGS DRILL BIT, STERILE

## (undated) DEVICE — MASK, FACE, TENET, FOAM POSITIONING, DISPOSABLE

## (undated) DEVICE — SUTURE, VICRYL, 2-0, 36 IN, CT-1, UNDYED

## (undated) DEVICE — GLOVE, SURGICAL, PROTEXIS PI , 8.0, PF, LF

## (undated) DEVICE — TIP, SUCTION, YANKAUER, FLEXIBLE

## (undated) DEVICE — SUTURE, MONOCRYL, 3-0, 27 IN, PS-2, UNDYED

## (undated) DEVICE — GLOVE, SURGICAL, PROTEXIS PI BLUE W/NEUTHERA, 8.5, PF, LF

## (undated) DEVICE — DRESSING, MEPILEX BORDER, POST-OP AG, 4 X 8 IN